# Patient Record
Sex: MALE | Race: WHITE | NOT HISPANIC OR LATINO | Employment: UNEMPLOYED | URBAN - METROPOLITAN AREA
[De-identification: names, ages, dates, MRNs, and addresses within clinical notes are randomized per-mention and may not be internally consistent; named-entity substitution may affect disease eponyms.]

---

## 2023-01-20 ENCOUNTER — APPOINTMENT (OUTPATIENT)
Dept: RADIOLOGY | Facility: MEDICAL CENTER | Age: 63
DRG: 660 | End: 2023-01-20
Attending: EMERGENCY MEDICINE
Payer: COMMERCIAL

## 2023-01-20 ENCOUNTER — HOSPITAL ENCOUNTER (INPATIENT)
Facility: MEDICAL CENTER | Age: 63
LOS: 13 days | DRG: 660 | End: 2023-02-03
Attending: EMERGENCY MEDICINE | Admitting: STUDENT IN AN ORGANIZED HEALTH CARE EDUCATION/TRAINING PROGRAM
Payer: COMMERCIAL

## 2023-01-20 DIAGNOSIS — N32.1 COLOVESICAL FISTULA: ICD-10-CM

## 2023-01-20 DIAGNOSIS — K57.92 DIVERTICULITIS: ICD-10-CM

## 2023-01-20 LAB
ALBUMIN SERPL BCP-MCNC: 3.5 G/DL (ref 3.2–4.9)
ALBUMIN/GLOB SERPL: 0.8 G/DL
ALP SERPL-CCNC: 144 U/L (ref 30–99)
ALT SERPL-CCNC: 43 U/L (ref 2–50)
ANION GAP SERPL CALC-SCNC: 12 MMOL/L (ref 7–16)
APPEARANCE UR: ABNORMAL
AST SERPL-CCNC: 54 U/L (ref 12–45)
BACTERIA #/AREA URNS HPF: ABNORMAL /HPF
BASOPHILS # BLD AUTO: 1 % (ref 0–1.8)
BASOPHILS # BLD: 0.14 K/UL (ref 0–0.12)
BILIRUB SERPL-MCNC: 1.3 MG/DL (ref 0.1–1.5)
BILIRUB UR QL STRIP.AUTO: ABNORMAL
BUN SERPL-MCNC: 17 MG/DL (ref 8–22)
CALCIUM ALBUM COR SERPL-MCNC: 9.8 MG/DL (ref 8.5–10.5)
CALCIUM SERPL-MCNC: 9.4 MG/DL (ref 8.5–10.5)
CHLORIDE SERPL-SCNC: 92 MMOL/L (ref 96–112)
CO2 SERPL-SCNC: 24 MMOL/L (ref 20–33)
COLOR UR: YELLOW
CREAT SERPL-MCNC: 0.74 MG/DL (ref 0.5–1.4)
EOSINOPHIL # BLD AUTO: 0.19 K/UL (ref 0–0.51)
EOSINOPHIL NFR BLD: 1.4 % (ref 0–6.9)
EPI CELLS #/AREA URNS HPF: NEGATIVE /HPF
ERYTHROCYTE [DISTWIDTH] IN BLOOD BY AUTOMATED COUNT: 41.3 FL (ref 35.9–50)
ETHANOL BLD-MCNC: <10.1 MG/DL
GFR SERPLBLD CREATININE-BSD FMLA CKD-EPI: 102 ML/MIN/1.73 M 2
GLOBULIN SER CALC-MCNC: 4.6 G/DL (ref 1.9–3.5)
GLUCOSE SERPL-MCNC: 94 MG/DL (ref 65–99)
GLUCOSE UR STRIP.AUTO-MCNC: NEGATIVE MG/DL
HCT VFR BLD AUTO: 48.6 % (ref 42–52)
HGB BLD-MCNC: 16.6 G/DL (ref 14–18)
HYALINE CASTS #/AREA URNS LPF: ABNORMAL /LPF
IMM GRANULOCYTES # BLD AUTO: 0.27 K/UL (ref 0–0.11)
IMM GRANULOCYTES NFR BLD AUTO: 2 % (ref 0–0.9)
KETONES UR STRIP.AUTO-MCNC: 15 MG/DL
LEUKOCYTE ESTERASE UR QL STRIP.AUTO: ABNORMAL
LIPASE SERPL-CCNC: 45 U/L (ref 11–82)
LYMPHOCYTES # BLD AUTO: 3.35 K/UL (ref 1–4.8)
LYMPHOCYTES NFR BLD: 25.1 % (ref 22–41)
MCH RBC QN AUTO: 31.2 PG (ref 27–33)
MCHC RBC AUTO-ENTMCNC: 34.2 G/DL (ref 33.7–35.3)
MCV RBC AUTO: 91.4 FL (ref 81.4–97.8)
MICRO URNS: ABNORMAL
MONOCYTES # BLD AUTO: 1.08 K/UL (ref 0–0.85)
MONOCYTES NFR BLD AUTO: 8.1 % (ref 0–13.4)
MUCOUS THREADS #/AREA URNS HPF: ABNORMAL /HPF
NEUTROPHILS # BLD AUTO: 8.34 K/UL (ref 1.82–7.42)
NEUTROPHILS NFR BLD: 62.4 % (ref 44–72)
NITRITE UR QL STRIP.AUTO: NEGATIVE
NRBC # BLD AUTO: 0 K/UL
NRBC BLD-RTO: 0 /100 WBC
PH UR STRIP.AUTO: 5.5 [PH] (ref 5–8)
PLATELET # BLD AUTO: 363 K/UL (ref 164–446)
PMV BLD AUTO: 9.2 FL (ref 9–12.9)
POTASSIUM SERPL-SCNC: 4 MMOL/L (ref 3.6–5.5)
PROT SERPL-MCNC: 8.1 G/DL (ref 6–8.2)
PROT UR QL STRIP: 30 MG/DL
RBC # BLD AUTO: 5.32 M/UL (ref 4.7–6.1)
RBC # URNS HPF: ABNORMAL /HPF
RBC UR QL AUTO: ABNORMAL
SODIUM SERPL-SCNC: 128 MMOL/L (ref 135–145)
SP GR UR STRIP.AUTO: >=1.03
UROBILINOGEN UR STRIP.AUTO-MCNC: 0.2 MG/DL
WBC # BLD AUTO: 13.4 K/UL (ref 4.8–10.8)
WBC #/AREA URNS HPF: ABNORMAL /HPF

## 2023-01-20 PROCEDURE — 85025 COMPLETE CBC W/AUTO DIFF WBC: CPT

## 2023-01-20 PROCEDURE — 82077 ASSAY SPEC XCP UR&BREATH IA: CPT

## 2023-01-20 PROCEDURE — 99285 EMERGENCY DEPT VISIT HI MDM: CPT

## 2023-01-20 PROCEDURE — 81001 URINALYSIS AUTO W/SCOPE: CPT

## 2023-01-20 PROCEDURE — 80053 COMPREHEN METABOLIC PANEL: CPT

## 2023-01-20 PROCEDURE — 83735 ASSAY OF MAGNESIUM: CPT

## 2023-01-20 PROCEDURE — 36415 COLL VENOUS BLD VENIPUNCTURE: CPT

## 2023-01-20 PROCEDURE — 93005 ELECTROCARDIOGRAM TRACING: CPT

## 2023-01-20 PROCEDURE — 83690 ASSAY OF LIPASE: CPT

## 2023-01-20 PROCEDURE — 93005 ELECTROCARDIOGRAM TRACING: CPT | Performed by: EMERGENCY MEDICINE

## 2023-01-20 ASSESSMENT — PAIN DESCRIPTION - DESCRIPTORS: DESCRIPTORS: ACHING

## 2023-01-21 PROBLEM — K70.30 ALCOHOLIC CIRRHOSIS OF LIVER WITHOUT ASCITES (HCC): Status: ACTIVE | Noted: 2023-01-21

## 2023-01-21 PROBLEM — N20.0 NEPHROLITHIASIS: Status: ACTIVE | Noted: 2023-01-21

## 2023-01-21 PROBLEM — F15.91 HISTORY OF METHAMPHETAMINE USE: Status: ACTIVE | Noted: 2023-01-21

## 2023-01-21 PROBLEM — N32.1 COLOVESICAL FISTULA: Status: ACTIVE | Noted: 2023-01-21

## 2023-01-21 PROBLEM — K80.20 CHOLELITHIASIS: Status: ACTIVE | Noted: 2023-01-21

## 2023-01-21 PROBLEM — J44.9 COPD (CHRONIC OBSTRUCTIVE PULMONARY DISEASE) (HCC): Status: ACTIVE | Noted: 2023-01-21

## 2023-01-21 PROBLEM — R78.81 BACTEREMIA: Status: ACTIVE | Noted: 2023-01-21

## 2023-01-21 PROBLEM — K57.92 DIVERTICULITIS: Status: ACTIVE | Noted: 2023-01-21

## 2023-01-21 PROBLEM — E87.1 HYPONATREMIA: Status: ACTIVE | Noted: 2023-01-21

## 2023-01-21 LAB
EKG IMPRESSION: NORMAL
MAGNESIUM SERPL-MCNC: 1.8 MG/DL (ref 1.5–2.5)

## 2023-01-21 PROCEDURE — 700105 HCHG RX REV CODE 258: Performed by: EMERGENCY MEDICINE

## 2023-01-21 PROCEDURE — 74177 CT ABD & PELVIS W/CONTRAST: CPT

## 2023-01-21 PROCEDURE — 700105 HCHG RX REV CODE 258: Performed by: STUDENT IN AN ORGANIZED HEALTH CARE EDUCATION/TRAINING PROGRAM

## 2023-01-21 PROCEDURE — 700111 HCHG RX REV CODE 636 W/ 250 OVERRIDE (IP): Performed by: EMERGENCY MEDICINE

## 2023-01-21 PROCEDURE — A9270 NON-COVERED ITEM OR SERVICE: HCPCS | Performed by: STUDENT IN AN ORGANIZED HEALTH CARE EDUCATION/TRAINING PROGRAM

## 2023-01-21 PROCEDURE — 87040 BLOOD CULTURE FOR BACTERIA: CPT | Mod: 91

## 2023-01-21 PROCEDURE — 99406 BEHAV CHNG SMOKING 3-10 MIN: CPT

## 2023-01-21 PROCEDURE — 700105 HCHG RX REV CODE 258

## 2023-01-21 PROCEDURE — 700111 HCHG RX REV CODE 636 W/ 250 OVERRIDE (IP): Performed by: STUDENT IN AN ORGANIZED HEALTH CARE EDUCATION/TRAINING PROGRAM

## 2023-01-21 PROCEDURE — 87086 URINE CULTURE/COLONY COUNT: CPT

## 2023-01-21 PROCEDURE — 700102 HCHG RX REV CODE 250 W/ 637 OVERRIDE(OP): Performed by: STUDENT IN AN ORGANIZED HEALTH CARE EDUCATION/TRAINING PROGRAM

## 2023-01-21 PROCEDURE — 700111 HCHG RX REV CODE 636 W/ 250 OVERRIDE (IP)

## 2023-01-21 PROCEDURE — 99233 SBSQ HOSP IP/OBS HIGH 50: CPT | Mod: GC | Performed by: HOSPITALIST

## 2023-01-21 PROCEDURE — A9270 NON-COVERED ITEM OR SERVICE: HCPCS

## 2023-01-21 PROCEDURE — A9270 NON-COVERED ITEM OR SERVICE: HCPCS | Performed by: HOSPITALIST

## 2023-01-21 PROCEDURE — 36415 COLL VENOUS BLD VENIPUNCTURE: CPT

## 2023-01-21 PROCEDURE — 700102 HCHG RX REV CODE 250 W/ 637 OVERRIDE(OP)

## 2023-01-21 PROCEDURE — 700102 HCHG RX REV CODE 250 W/ 637 OVERRIDE(OP): Performed by: HOSPITALIST

## 2023-01-21 PROCEDURE — 87077 CULTURE AEROBIC IDENTIFY: CPT

## 2023-01-21 PROCEDURE — 99223 1ST HOSP IP/OBS HIGH 75: CPT | Mod: GC | Performed by: STUDENT IN AN ORGANIZED HEALTH CARE EDUCATION/TRAINING PROGRAM

## 2023-01-21 PROCEDURE — 770001 HCHG ROOM/CARE - MED/SURG/GYN PRIV*

## 2023-01-21 PROCEDURE — 87186 SC STD MICRODIL/AGAR DIL: CPT

## 2023-01-21 PROCEDURE — 700117 HCHG RX CONTRAST REV CODE 255: Performed by: EMERGENCY MEDICINE

## 2023-01-21 PROCEDURE — 96365 THER/PROPH/DIAG IV INF INIT: CPT

## 2023-01-21 RX ORDER — POLYETHYLENE GLYCOL 3350 17 G/17G
1 POWDER, FOR SOLUTION ORAL
Status: DISCONTINUED | OUTPATIENT
Start: 2023-01-21 | End: 2023-01-21

## 2023-01-21 RX ORDER — IBUPROFEN 400 MG/1
400 TABLET ORAL ONCE
Status: COMPLETED | OUTPATIENT
Start: 2023-01-21 | End: 2023-01-21

## 2023-01-21 RX ORDER — BUDESONIDE AND FORMOTEROL FUMARATE DIHYDRATE 160; 4.5 UG/1; UG/1
2 AEROSOL RESPIRATORY (INHALATION)
Status: DISCONTINUED | OUTPATIENT
Start: 2023-01-21 | End: 2023-01-21

## 2023-01-21 RX ORDER — BISACODYL 10 MG
10 SUPPOSITORY, RECTAL RECTAL
Status: DISCONTINUED | OUTPATIENT
Start: 2023-01-21 | End: 2023-01-21

## 2023-01-21 RX ORDER — CHOLECALCIFEROL (VITAMIN D3) 125 MCG
5 CAPSULE ORAL NIGHTLY
Status: DISCONTINUED | OUTPATIENT
Start: 2023-01-21 | End: 2023-02-03 | Stop reason: HOSPADM

## 2023-01-21 RX ORDER — AMOXICILLIN AND CLAVULANATE POTASSIUM 875; 125 MG/1; MG/1
1 TABLET, FILM COATED ORAL EVERY 12 HOURS
Status: DISCONTINUED | OUTPATIENT
Start: 2023-01-21 | End: 2023-01-21

## 2023-01-21 RX ORDER — BUDESONIDE AND FORMOTEROL FUMARATE DIHYDRATE 160; 4.5 UG/1; UG/1
2 AEROSOL RESPIRATORY (INHALATION) 2 TIMES DAILY
Status: DISCONTINUED | OUTPATIENT
Start: 2023-01-21 | End: 2023-02-03 | Stop reason: HOSPADM

## 2023-01-21 RX ORDER — AMOXICILLIN 250 MG
2 CAPSULE ORAL 2 TIMES DAILY
Status: DISCONTINUED | OUTPATIENT
Start: 2023-01-21 | End: 2023-01-21

## 2023-01-21 RX ADMIN — Medication 5 MG: at 23:32

## 2023-01-21 RX ADMIN — PIPERACILLIN AND TAZOBACTAM 3.38 G: 3; .375 INJECTION, POWDER, LYOPHILIZED, FOR SOLUTION INTRAVENOUS; PARENTERAL at 00:40

## 2023-01-21 RX ADMIN — BUDESONIDE AND FORMOTEROL FUMARATE DIHYDRATE 2 PUFF: 160; 4.5 AEROSOL RESPIRATORY (INHALATION) at 18:00

## 2023-01-21 RX ADMIN — PIPERACILLIN AND TAZOBACTAM 3.38 G: 3; .375 INJECTION, POWDER, LYOPHILIZED, FOR SOLUTION INTRAVENOUS; PARENTERAL at 22:33

## 2023-01-21 RX ADMIN — PIPERACILLIN AND TAZOBACTAM 3.38 G: 3; .375 INJECTION, POWDER, LYOPHILIZED, FOR SOLUTION INTRAVENOUS; PARENTERAL at 03:47

## 2023-01-21 RX ADMIN — PIPERACILLIN AND TAZOBACTAM 3.38 G: 3; .375 INJECTION, POWDER, LYOPHILIZED, FOR SOLUTION INTRAVENOUS; PARENTERAL at 12:35

## 2023-01-21 RX ADMIN — IBUPROFEN 400 MG: 400 TABLET, FILM COATED ORAL at 23:31

## 2023-01-21 RX ADMIN — IOHEXOL 100 ML: 350 INJECTION, SOLUTION INTRAVENOUS at 00:06

## 2023-01-21 ASSESSMENT — ENCOUNTER SYMPTOMS
SPUTUM PRODUCTION: 0
FLANK PAIN: 0
COUGH: 0
WEIGHT LOSS: 0
FALLS: 0
BACK PAIN: 0
HEADACHES: 0
HEARTBURN: 0
WEAKNESS: 0
DIARRHEA: 1
MYALGIAS: 1
TREMORS: 0
BLOOD IN STOOL: 0
ABDOMINAL PAIN: 0
NERVOUS/ANXIOUS: 0
ORTHOPNEA: 0
VOMITING: 0
MYALGIAS: 0
ABDOMINAL PAIN: 1
CONSTIPATION: 0
SEIZURES: 0
SORE THROAT: 0
NAUSEA: 0
WHEEZING: 1
EYE PAIN: 0
PALPITATIONS: 0
DIZZINESS: 0
EYE REDNESS: 0
NECK PAIN: 0
DIARRHEA: 0
BLURRED VISION: 0
SHORTNESS OF BREATH: 0
STRIDOR: 0
CHILLS: 0
FEVER: 0
SENSORY CHANGE: 0

## 2023-01-21 ASSESSMENT — COGNITIVE AND FUNCTIONAL STATUS - GENERAL
SUGGESTED CMS G CODE MODIFIER MOBILITY: CH
DAILY ACTIVITIY SCORE: 24
SUGGESTED CMS G CODE MODIFIER DAILY ACTIVITY: CH
MOBILITY SCORE: 24

## 2023-01-21 ASSESSMENT — LIFESTYLE VARIABLES
TOTAL SCORE: 0
HAVE PEOPLE ANNOYED YOU BY CRITICIZING YOUR DRINKING: NO
TOTAL SCORE: 0
TOTAL SCORE: 0
SUBSTANCE_ABUSE: 1
HAVE YOU EVER FELT YOU SHOULD CUT DOWN ON YOUR DRINKING: NO
CONSUMPTION TOTAL: INCOMPLETE
EVER FELT BAD OR GUILTY ABOUT YOUR DRINKING: NO
DOES PATIENT WANT TO STOP DRINKING: NO
ALCOHOL_USE: YES
EVER HAD A DRINK FIRST THING IN THE MORNING TO STEADY YOUR NERVES TO GET RID OF A HANGOVER: NO

## 2023-01-21 ASSESSMENT — FIBROSIS 4 INDEX
FIB4 SCORE: 1.41
FIB4 SCORE: 1.41

## 2023-01-21 ASSESSMENT — PATIENT HEALTH QUESTIONNAIRE - PHQ9
2. FEELING DOWN, DEPRESSED, IRRITABLE, OR HOPELESS: NOT AT ALL
2. FEELING DOWN, DEPRESSED, IRRITABLE, OR HOPELESS: NOT AT ALL
1. LITTLE INTEREST OR PLEASURE IN DOING THINGS: NOT AT ALL
SUM OF ALL RESPONSES TO PHQ9 QUESTIONS 1 AND 2: 0
SUM OF ALL RESPONSES TO PHQ9 QUESTIONS 1 AND 2: 0
1. LITTLE INTEREST OR PLEASURE IN DOING THINGS: NOT AT ALL

## 2023-01-21 ASSESSMENT — PAIN DESCRIPTION - PAIN TYPE: TYPE: ACUTE PAIN

## 2023-01-21 NOTE — ASSESSMENT & PLAN NOTE
Nonobstructing nephrolithiasis , incidental finding on imaging.  Continue to monitor renal function and urine output

## 2023-01-21 NOTE — PROGRESS NOTES
[facility] Internal Medicine Daily Progress Note    Date of Service  1/21/2023    [facility] Team: [facility] IM White Team   Attending: Joni Nieves M.d.  Senior Resident: Dr. De Los Santos  Intern:  Dr. Noe  Contact Number: [phone]    Chief Complaint  Jason Yip is a 62 y.o. male admitted 1/20/2023 with dizziness and abdominal pain.     Hospital Course  Jason Yip is a 62 y.o. male who presented 1/20/2023 abdominal pain.   He has a past medical history of cirrhosis (seen on imaging) COPD and polysubstance use (however patient states that he quit several weeks ago). Patient initially presented to [facility] on 1/4/2023 reporting 2 months of difficulty with urination. On initial presentation he was found to be septic with a leukocytosis of over 14,000.  At the time CT abdomen pelvis revealed severe sigmoid diverticulitis with inflammatory phlegmon formation and possible colovesicular fistula. At that time he was treated with ertapenem, Flagyl, and also treated for alcohol withdrawal.  Surgery was consulted and recommended no surgical intervention at the time.   He later presented to [facility] again on 1/15/2023 for evaluation of lower abdominal pain and bubbles in his urine.  Urine cultures at the time revealed resistance to ciprofloxacin.  Repeat CT revealed sigmoid colovesicular fistula and patient was transferred to [facility] for higher level of care on 1/17/2023.  (Records not available from [facility]) Per patient while he was at [facility] he was told that he required IV antibiotics and surgical intervention on outpatient basis after antibiotic treatment for 2 weeks and was discharged on 1/19/2023. Patient states that he was discharged on p.o. Augmentin with a Taxi voucher that took him somewhere in [place] rather than back to [place] and he has been homeless since then. He presented to [facility] and was  discharged back to the shelter last night. He was brought by ambulance from [facility] for continued abd pain.     Interval Problem Update  Reviewed hx with Mr. Yip. He said he was not currently having any abdominal pain. He said when present it is a 3-4/10 and when it is at it's worse it is a 9/10. It has only braulio alleviated with dilaudid and is worsened with urination and with Bms. His main complaint this morning was that he was having cramping of his hands and feet but said that is a chronic problem for him. He was hopeful to talk to urology and said that he was upset at the previous hospital when told he would need 2 wks abs before surgery.   -Requested records from [facility] and [facility].   -Continue IV Abx (rec by Andreas as tx for complicated diverticulitis)   -Consult surgery in AM.   -Melatonin QPM    I have discussed this patient's plan of care and discharge plan at IDT rounds today with Case Management, Nursing, Nursing leadership, and other members of the IDT team.    Consultants/Specialty  None    Code Status  Full Code    Disposition  Patient is not medically cleared for discharge.   Anticipate discharge to to home with close outpatient follow-up.  I have placed the appropriate orders for post-discharge needs.    Review of Systems  Review of Systems   Constitutional:  Negative for chills, fever and malaise/fatigue.   HENT:  Negative for congestion and sore throat.    Respiratory:  Negative for cough, sputum production, shortness of breath and stridor.    Cardiovascular:  Negative for chest pain, palpitations and orthopnea.   Gastrointestinal:  Negative for abdominal pain, blood in stool, constipation, diarrhea, heartburn, melena, nausea and vomiting.   Genitourinary:  Negative for dysuria, flank pain, frequency, hematuria and urgency.   Musculoskeletal:  Positive for myalgias. Negative for back pain and neck pain.        Chronic cramping of hands and feet bilat.     Neurological:  Negative for dizziness, tremors, weakness and headaches.      Physical Exam  Temp:  [36.1 °C (97 °F)-36.2 °C (97.2 °F)] 36.1 °C (97 °F)  Pulse:  [66-96] 66  Resp:  [16-20] 16  BP: (113-149)/(68-91) 134/78  SpO2:  [90 %-95 %] 95 %    Physical Exam  Vitals and nursing note reviewed.   Constitutional:       General: He is not in acute distress.     Appearance: Normal appearance. He is normal weight. He is not ill-appearing or toxic-appearing.   HENT:      Head: Normocephalic and atraumatic.      Mouth/Throat:      Mouth: Mucous membranes are moist.      Pharynx: Oropharynx is clear. No oropharyngeal exudate or posterior oropharyngeal erythema.   Cardiovascular:      Rate and Rhythm: Normal rate and regular rhythm.      Pulses: Normal pulses.      Heart sounds: Normal heart sounds. No murmur heard.    No friction rub. No gallop.   Pulmonary:      Effort: Pulmonary effort is normal. No respiratory distress.      Breath sounds: Normal breath sounds. No stridor. No wheezing, rhonchi or rales.   Abdominal:      General: Abdomen is flat. Bowel sounds are normal. There is no distension.      Palpations: Abdomen is soft.      Tenderness: There is no abdominal tenderness. There is no guarding or rebound.      Comments: Patient said pain is usually at suprapubic area. No pain with palpation.    Musculoskeletal:         General: No swelling or deformity.      Right lower leg: No edema.      Left lower leg: No edema.   Skin:     General: Skin is warm and dry.      Coloration: Skin is not jaundiced or pale.      Findings: No bruising, lesion or rash.   Neurological:      Mental Status: He is alert and oriented to person, place, and time.       Fluids    Intake/Output Summary (Last 24 hours) at 1/21/2023 0712  Last data filed at 1/21/2023 0124  Gross per 24 hour   Intake 100 ml   Output --   Net 100 ml       Laboratory  Recent Labs     01/20/23  1818   WBC 13.4*   RBC 5.32   HEMOGLOBIN 16.6   HEMATOCRIT 48.6   MCV  91.4   MCH 31.2   MCHC 34.2   RDW 41.3   PLATELETCT 363   MPV 9.2     Recent Labs     01/20/23  1818   SODIUM 128*   POTASSIUM 4.0   CHLORIDE 92*   CO2 24   GLUCOSE 94   BUN 17   CREATININE 0.74   CALCIUM 9.4                   Imaging  CT-ABDOMEN-PELVIS WITH    Result Date: 1/21/2023  1.  Changes of sigmoid diverticulitis, small collection of fluid with focus of air adjacent to sigmoid colon is seen compatible with microperforation and small diverticular abscess. 2.  Colovesicular fistula is seen with air in the bladder. 3.  Irregular hepatic contour favoring changes of cirrhosis 4.  Segment 2 duodenal diverticula 5.  Small fat-containing right inguinal hernia 6.  Left nephrolithiasis 7.  Cholelithiasis 8.  Atherosclerosis These findings were discussed with the patient's clinician, Kenji Coronel, on 1/21/2023 12:22 AM.    CT-RENAL COLIC EVALUATION(A/P W/O)    Result Date: 1/4/2023  IMPRESSION: 1. Severe sigmoid diverticulosis with severe diverticulitis with inflammatory phlegmon along the right with inflammatory changes extending to the dome of the bladder and 2 adjacent loop of small bowel. No drainable abscess is present. 2. These findings were discussed with Dr. Gore shortly following completion of the study.    Electronically signed by: Carlos Taylor 1/4/2023 11:39 AM       [id number] VIEW    Result Date: 1/15/2023  IMPRESSION: 1. Mild adynamic ileus.       Electronically signed by: Carlos Taylor 1/15/2023 12:52 PM       DX-CHEST-PORTABLE (1 VIEW)    Result Date: 1/15/2023  IMPRESSION: 1. No evidence of acute process in the chest.    Electronically signed by: Carlos Taylor 1/15/2023 12:50 PM       CT ABDOMEN-PELVIS WITH IV CONTRAST    Result Date: 1/15/2023  IMPRESSION: 1. Severe sigmoid diverticulitis with probable multiple small abscesses in the surrounding tissues. No large drainable abscess is seen. The inflammatory changes extend to the bladder. Air in the bladder is consistent with a  sigmoid colon bladder fistula. 2. Tiny gallstone without evidence of acute cholecystitis or bile duct dilatation. 3. Splenomegaly without focal abnormality.    Electronically signed by: Carlos Taylor 1/15/2023 4:25 PM       CT-CTA CHEST PULMONARY ARTERY W/ RECONS    Result Date: 1/16/2023  IMPRESSION: 1. No evidence of pulmonary emboli. 2. New subsegmental atelectasis right lower lobe.    Electronically signed by: Carlos Taylor 1/16/2023 12:15 PM           Assessment/Plan  Problem Representation:    * Colovesical fistula  Assessment & Plan  -Seen on imaging on 117 and again on 1/21.  Per Dr. Leong who evaluated patient at [facility] 1/17, recommended 2 weeks of antibiotics prior to proceeding with surgery.  -Prior cultures revealed resistance to ciprofloxacin.   -Surgery consult  and urology consult in a.m.  -Full liquid diet  -Patient initiated on Zosyn.  May consider transition to Augmentin if patient stabilizes.      Complicated Diverticulitis- (present on admission)  Assessment & Plan  -Complicated by multiple phlegmon and colovesicular fistula  Plan:   -Continue IV Zosyn  -Full liquid diet  -serial CT scans until the resolution of the abscess; If he fails to improve after two to three days of antibiotic therapy may require surgery if percutaneous drainage is not an option.     Bacteremia  Assessment & Plan  -Per outside hospital records blood cultures positive for gram positive cocci  -Repeat blood cultures  -Obtain [facility] records     History of methamphetamine use  Assessment & Plan  -Patient denies any use in the past several weeks  -Encourage continued sobriety     Nephrolithiasis  Assessment & Plan  -Nonobstructing nephrolithiasis , incidental finding on imaging.  -Continue to monitor renal function and urine output     Cholelithiasis  Assessment & Plan  -Asymptomatic  -Outpatient follow up     Hyponatremia  Assessment & Plan  -128 on admission  -Likely secondary to GI losses  and possible underlying cirrhosis  -Currently asymptomatic  -Continue to monitor     COPD (chronic obstructive pulmonary disease) (HCC)  Assessment & Plan  -No PFTs on file  -Chest x-ray clear on admission, no wheezing on exam  -Symbicort daily  -As needed albuterol     Alcoholic cirrhosis of liver without ascites (HCC)  Assessment & Plan  -Cirrhotic changes seen on imaging  -Patient is mildly distended on exam however no clear signs of ascites.  No hepatic encephalopathy at -this time or any signs of melena      VTE prophylaxis: SCDs/TEDs      I have performed a physical exam and reviewed and updated ROS and Plan today (1/21/2023). In review of yesterday's note (1/20/2023), there are no changes except as documented above.

## 2023-01-21 NOTE — ED NOTES
Pt prompted and informed of need for urine. Pt is unable to provide urine at this point and wants to sleep, pt educated on need for urine and handed urinal.

## 2023-01-21 NOTE — ED TRIAGE NOTES
"Chief Complaint   Patient presents with    Dizziness    Abdominal Pain     Upper quadrants, denies NVD       Pt BIBA from [facility].  Pt was flown by fixed wing on 1/17/23 for \"severe diverticulitis with inta abdominal abscesses and colovesicular fistula.\"  Surgeon wants to wait several weeks before surgery.  Pt discharged 1/19 from [facility].  Pt reporting lower abdominal pain denies NVD.  Pt reports \"bubbles are coming out of his penis after he pees.\" Denies dysuria.  Pt reporting dizziness and generalized weakness.     Blood Pressure: (!) 149/78, Pulse: 91, Respiration: 18, Temperature: 36.2 °C (97.2 °F), Height: 182.9 cm (6'), Weight: 81.2 kg (179 lb), BMI (Calculated): 24.28, BSA (Calculated): 2, Pulse Oximetry: 94 %, O2 (LPM): 0, O2 Delivery Device: None - Room Air    "

## 2023-01-21 NOTE — ASSESSMENT & PLAN NOTE
Given 128 on admission  Likely secondary to GI losses and possible underlying cirrhosis  Currently asymptomatic  Continue to monitor

## 2023-01-21 NOTE — ASSESSMENT & PLAN NOTE
Per outside hospital records blood cultures positive for gram positive cocci  Repeat blood cultures  Continue Unasyn  Obtain [facility] records

## 2023-01-21 NOTE — ASSESSMENT & PLAN NOTE
Cirrhotic changes seen on imaging  Patient is mildly distended on exam however no clear signs of ascites.  No hepatic encephalopathy at this time or any signs of melena

## 2023-01-21 NOTE — CARE PLAN
The patient is Stable - Low risk of patient condition declining or worsening         Progress made toward(s) clinical / shift goals:  pt educated on POC. Pt verbalized understanding      Problem: Knowledge Deficit - Standard  Goal: Patient and family/care givers will demonstrate understanding of plan of care, disease process/condition, diagnostic tests and medications  Outcome: Progressing

## 2023-01-21 NOTE — RESPIRATORY CARE
"  COPD EDUCATION by COPD CLINICAL EDUCATOR  (Phone: [phone])  1/21/2023 at 10:47 AM by Carla Fine RRT     Patient was interviewed by Respiratory Education team for COPD program. Patient refused COPD program. Information packet about lung disease, its treatments and \"Stop Smoking\" information given. Total of 6 minutes spent discussion cessation of all inhaled products and encouraged cessation.    COPD Assessment  COPD Clinical Specialists ONLY  COPD Education Initiated: Yes--Short Intervention  DME Company: none prior currently on Room [employer]  Physician Follow Up Appointment: 02/02/23  Physician Name: Came from [place], [place] Dr Carie MD is PCP next appt noted  Pulmonologist Name: none prior  Referrals Initiated: Yes  Smoking Cessation: Yes  $ Smoking Cessation 3-10 Minutes: Symptomatic  Home Health Care:  (SW/CM working on safe DC back to [place] when medically stable)  Is this a COPD exacerbation patient?: No    PFT Results  No results found for: PFT    Meds to Beds  Would the patient like to opt in for Bedside Medication Delivery at Discharge?: Yes, interested (lives out of town)     MY COPD ACTION PLAN     It is recommended that patients and physicians /healthcare providers complete this action plan together. This plan should be discussed at each physician visit and updated as needed.    The green, yellow and red zones show groups of symptoms of COPD. This list of symptoms is not comprehensive, and you may experience other symptoms. In the \"Actions\" column, your healthcare provider has recommended actions for you to take based on your symptoms.    Patient Name: Jason Yip   YOB: 1960   Last Updated on:     Green Zone:  I am doing well today Actions   •  Usual activitiy and exercise level •  Take daily medications   •  Usual amounts of cough and phlegm/mucus •  Use oxygen as prescribed   •  Sleep well at night •  Continue regular exercise/diet plan   •  Appetite is good •  At all times avoid " "cigarette smoke, inhaled irritants     Daily Medications (these medications are taken every day):   Budesonide-Formoterol Fumarate (Symbicort) 2 Puffs Twice daily     Additional Information:  THis has been started while inpatient and will continue at discharge. Remember to rinse mouth after use    Yellow Zone:  I am having a bad day or a COPD flare Actions   •  More breathless than usual •  Continue daily medications   •  I have less energy for my daily activities •  Use quick relief inhaler as ordered   •  Increased or thicker phlegm/mucus •  Use oxygen as prescribed   •  Using quick relief inhaler/nebulizer more often •  Get plenty of rest   •  Swelling of ankles more than usual •  Use pursed lip breathing   •  More coughing than usual •  At all times avoid cigarette smoke, inhaled irritants   •  I feel like I have a \"chest cold\"   •  Poor sleep and my symptoms woke me up   •  My appetite is not good   •  My medicine is not helping    •  Call provider immediately if symptoms don’t improve     Continue daily medications, add rescue medications:   Albuterol 2 Puffs         Medications to be used during a flare up, (as Discussed with Provider):              Red Zone:  I need urgent medical care Actions   •  Severe shortness of breath even at rest •  Call 911 or seek medical care immediately   •  Not able to do any activity because of breathing    •  Fever or shaking chills    •  Feeling confused or very drowsy     •  Chest pains    •  Coughing up blood                  "

## 2023-01-21 NOTE — ED PROVIDER NOTES
ED Provider Note    CHIEF COMPLAINT  Chief Complaint   Patient presents with    Dizziness    Abdominal Pain     Upper quadrants, denies NVD       EXTERNAL RECORDS REVIEWED  Other review the patient's discharge paperwork that he has in his hand from [facility]    HPI/ROS    OUTSIDE HISTORIAN(S):  EMS run sheet and signed off to the nurse    Jason Phi is a 62 y.o. male who presents with abdominal pain.  The patient has a complex history.  He presents with his paperwork when he was recently discharged from [facility] after he was found to have a colovesicular fistula and diverticulitis.  The patient states this started about 5 weeks ago when he started having difficulties with urination and this progressed to abdominal pain and he noticed that he is passing some gas through his penis.  The patient states that he also developed difficulties with balance and felt weak.  The patient states he was abusing alcohol pretty heavily as well as some amphetamines.  He was evaluated at a small hospital in [place] and was found to have the diverticulitis with the fistula and he was transferred to [facility].  The patient was seen by Dr. Gonzalez who recommended IV antibiotics and surgical intervention on an outpatient basis.  The patient states that he was discharged from [facility] with a taxi voucher that took him somewhere in [place] instead of back home to [place].  He has been homeless since that time.  He went to the homeless shelter last night and then called the ambulance [facility].  Ambulance run sheet states the patient complained of abdominal pain and he was transferred from the shelter to [facility] for further evaluation.    PAST MEDICAL HISTORY   has a past medical history of Chronic obstructive pulmonary disease (HCC).    SURGICAL HISTORY  patient denies any surgical history    FAMILY HISTORY  History reviewed. No pertinent family  history.    SOCIAL HISTORY  Social History     Tobacco Use    Smoking status: Every Day     Packs/day: 0.50     Types: Cigarettes    Smokeless tobacco: Not on file   Substance and Sexual Activity    Alcohol use: Yes     Alcohol/week: 72.0 oz     Types: 120 Standard drinks or equivalent per week    Drug use: Not on file    Sexual activity: Not on file       CURRENT MEDICATIONS  Home Medications       Reviewed by Marleni Justin R.N. (Registered Nurse) on 01/20/23 at 1816  Med List Status: <None>     Medication Last Dose Status        Patient Reji Taking any Medications                           ALLERGIES  Allergies   Allergen Reactions    Keflex Hives       PHYSICAL EXAM  VITAL SIGNS: BP (!) 149/78   Pulse 91   Temp 36.2 °C (97.2 °F) (Temporal)   Resp 18   Ht 1.829 m (6')   Wt 81.2 kg (179 lb)   SpO2 94%   BMI 24.28 kg/m²    Patient appears ill in appearance    HEENT unremarkable    Pulmonary diminished breath sounds bilaterally with no asymmetry to auscultation    Cardiovascular S1-S2 with a regular rate and rhythm    GI slight distention with hypoactive bowel sounds.  The patient does have tenderness in the left lower quadrant    Skin no pallor    Extremities no edema and atraumatic    Neurologic examination is grossly intact    DIAGNOSTIC STUDIES   Results for orders placed or performed during the hospital encounter of 01/20/23   CBC WITH DIFFERENTIAL   Result Value Ref Range    WBC 13.4 (H) 4.8 - 10.8 K/uL    RBC 5.32 4.70 - 6.10 M/uL    Hemoglobin 16.6 14.0 - 18.0 g/dL    Hematocrit 48.6 42.0 - 52.0 %    MCV 91.4 81.4 - 97.8 fL    MCH 31.2 27.0 - 33.0 pg    MCHC 34.2 33.7 - 35.3 g/dL    RDW 41.3 35.9 - 50.0 fL    Platelet Count 363 164 - 446 K/uL    MPV 9.2 9.0 - 12.9 fL    Neutrophils-Polys 62.40 44.00 - 72.00 %    Lymphocytes 25.10 22.00 - 41.00 %    Monocytes 8.10 0.00 - 13.40 %    Eosinophils 1.40 0.00 - 6.90 %    Basophils 1.00 0.00 - 1.80 %    Immature Granulocytes 2.00 (H) 0.00 - 0.90 %    Nucleated  RBC 0.00 /100 WBC    Neutrophils (Absolute) 8.34 (H) 1.82 - 7.42 K/uL    Lymphs (Absolute) 3.35 1.00 - 4.80 K/uL    Monos (Absolute) 1.08 (H) 0.00 - 0.85 K/uL    Eos (Absolute) 0.19 0.00 - 0.51 K/uL    Baso (Absolute) 0.14 (H) 0.00 - 0.12 K/uL    Immature Granulocytes (abs) 0.27 (H) 0.00 - 0.11 K/uL    NRBC (Absolute) 0.00 K/uL   COMP METABOLIC PANEL   Result Value Ref Range    Sodium 128 (L) 135 - 145 mmol/L    Potassium 4.0 3.6 - 5.5 mmol/L    Chloride 92 (L) 96 - 112 mmol/L    Co2 24 20 - 33 mmol/L    Anion Gap 12.0 7.0 - 16.0    Glucose 94 65 - 99 mg/dL    Bun 17 8 - 22 mg/dL    Creatinine 0.74 0.50 - 1.40 mg/dL    Calcium 9.4 8.5 - 10.5 mg/dL    AST(SGOT) 54 (H) 12 - 45 U/L    ALT(SGPT) 43 2 - 50 U/L    Alkaline Phosphatase 144 (H) 30 - 99 U/L    Total Bilirubin 1.3 0.1 - 1.5 mg/dL    Albumin 3.5 3.2 - 4.9 g/dL    Total Protein 8.1 6.0 - 8.2 g/dL    Globulin 4.6 (H) 1.9 - 3.5 g/dL    A-G Ratio 0.8 g/dL   LIPASE   Result Value Ref Range    Lipase 45 11 - 82 U/L   URINALYSIS (UA)    Specimen: Urine, Clean Catch   Result Value Ref Range    Color Yellow     Character Cloudy (A)     Specific Gravity >=1.030 <1.035    Ph 5.5 5.0 - 8.0    Glucose Negative Negative mg/dL    Ketones 15 (A) Negative mg/dL    Protein 30 (A) Negative mg/dL    Bilirubin Small (A) Negative    Urobilinogen, Urine 0.2 Negative    Nitrite Negative Negative    Leukocyte Esterase Moderate (A) Negative    Occult Blood Large (A) Negative    Micro Urine Req Microscopic    DIAGNOSTIC ALCOHOL   Result Value Ref Range    Diagnostic Alcohol <10.1 <10.1 mg/dL   ESTIMATED GFR   Result Value Ref Range    GFR (CKD-EPI) 102 >60 mL/min/1.73 m 2   CORRECTED CALCIUM   Result Value Ref Range    Correct Calcium 9.8 8.5 - 10.5 mg/dL   URINE MICROSCOPIC (W/UA)   Result Value Ref Range    -150 (A) /hpf    RBC 2-5 (A) /hpf    Bacteria Few (A) None /hpf    Epithelial Cells Negative /hpf    Mucous Threads Few /hpf    Hyaline Cast 0-2 /lpf   EKG   Result Value  Ref Range    Report       [facility] Emergency Dept.    Test Date:  2023  Pt Name:    MERCY ZAMUDIO                 Department: ER  MRN:        [id number]                      Room:       RD 02  Gender:     Male                         Technician: [id number]  :        1960                   Requested By:ER TRIAGE PROTOCOL  Order #:    [id number]                    Reading MD: JASKARAN CORONEL MD    Measurements  Intervals                                Axis  Rate:       92                           P:          33  NY:         131                          QRS:        4  QRSD:       88                           T:          26  QT:         375  QTc:        464    Interpretive Statements  Twelve-lead EKG shows a normal sinus rhythm with a ventricular rate of 92,  normal QRS, normal intervals, no ST segment elevation or depression  Electronically Signed On 2023 0:33:33 PST by JASKARAN CORONEL MD         EKG  I have independently interpreted this EKG  Please see my interpretation above      RADIOLOGY  CT-ABDOMEN-PELVIS WITH   Final Result         1.  Changes of sigmoid diverticulitis, small collection of fluid with focus of air adjacent to sigmoid colon is seen compatible with microperforation and small diverticular abscess.   2.  Colovesicular fistula is seen with air in the bladder.   3.  Irregular hepatic contour favoring changes of cirrhosis   4.  Segment 2 duodenal diverticula   5.  Small fat-containing right inguinal hernia   6.  Left nephrolithiasis   7.  Cholelithiasis   8.  Atherosclerosis      These findings were discussed with the patient's clinician, Jaskaran Coronel, on 2023 12:22 AM.          COURSE & MEDICAL DECISION MAKING    This is a 62-year-old gentleman who presents the emerged part with abdominal discomfort and dizziness.  I suspect the dizziness is from his intra-abdominal infection as his EKG does not show any arrhythmic change.  The patient is neurologically  intact therefore an intracranial source would be unlikely.  I did review his records from [facility] where he had consultation by Dr. Gonzalez as well as urology.  They recommended IV antibiotics to cool the infection prior to surgical intervention.  The patient was discharged on Augmentin.  He was sent to [facility] and unfortunately is been on the street in the shelter over the last 24 to 48 hours.  The patient does have continued discomfort therefore repeat imaging was performed.  He does have diverticulitis with a small fluid collection consistent with perforation and diverticular abscess formation.  He also has a colovesicular fistula.  I suspect this is the source of the patient's infection from the urinalysis.  The patient will receive Zosyn for antibiotic coverage.  We will get the patient back to the hospital with urology and surgery in consultation.      FINAL DIAGNOSIS  1.  Diverticulitis with perforation and abscess formation  2.  Colovesicular fistula  3.  Dizziness    Disposition  Patient will be admitted in stable condition           Electronically signed by: Kenji Coronel M.D., 1/20/2023 6:42 PM

## 2023-01-21 NOTE — ASSESSMENT & PLAN NOTE
Seen on imaging on 117 and again on 1/21.  Per Dr. Leong who evaluated patient at [facility] 1/17, recommended 2 weeks of antibiotics prior to proceeding with surgery.  Prior cultures revealed resistance to ciprofloxacin.   Surgery consult  and urology consult in a.m.  Full liquid diet  Patient initiated on Zosyn.  May consider transition to Augmentin if patient stabilizes.

## 2023-01-21 NOTE — ED NOTES
Call to receiving RN, Heena, report given, pt transport with ED tech to room S611-2 with all personal items

## 2023-01-21 NOTE — PROGRESS NOTES
Assumed care of pt. A&ox4, denies pain at this time. Pt refused 2 RN skin check despite education. Pt educated on POC. Bed locked and in the lowest position, call light within reach, all needs met at this time.

## 2023-01-21 NOTE — ASSESSMENT & PLAN NOTE
Complicated by multiple phlegmon and colovesicular fistula  Continue IV Zosyn  Full liquid diet  Follow-up with surgery

## 2023-01-21 NOTE — PROGRESS NOTES
"4 Eyes Skin Assessment Completed by STEVE Naik and STEVE Barroso.    Overall skin apperance dry/flakey  Pt refused skin check. Pt states \"I have no wounds, theres no reason to check\"      Interventions In Place Waffle Overlay and Pillows    Possible Skin Injury No    Pictures Uploaded Into Epic N/A  Wound Consult Placed N/A  RN Wound Prevention Protocol Ordered No    "

## 2023-01-21 NOTE — ASSESSMENT & PLAN NOTE
No PFTs on file  Chest x-ray clear on admission, no wheezing on exam  Symbicort daily  As needed albuterol

## 2023-01-21 NOTE — H&P
[facility] Internal Medicine History & Physical Note    Date of Service  1/21/2023    [facility] Team: ZARA   Attending: Elie Judd M.d.  Senior Resident: Dr. Leia Coppola  Contact Number: [phone]    Primary Care Physician  Pcp Pt States None    Consultants    General Surgery: Dr. Leong    Code Status  Full Code    Chief Complaint  Chief Complaint   Patient presents with    Dizziness    Abdominal Pain     Upper quadrants, denies NVD       History of Presenting Illness (HPI):       Jason Yip is a 62 y.o. male who presented 1/20/2023 abdominal pain.   He has a past medical history of cirrhosis (seen on imaging) COPD and polysubstance use (however patient states that he quit several weeks ago).           Patient initially presented to [facility] in [facility] on 1/4/2023 reporting 2 months of difficulty with urination.  Initial presentation he was found to be septic with a leukocytosis of over 14,000.  At the time CT abdomen pelvis revealed severe sigmoid diverticulitis with inflammatory phlegmon formation and possible colovesicular fistula.  The time he was treated with ertapenem, Flagyl.  Also treated for alcohol withdrawal.  Surgery was consulted and recommended no surgical intervention at the time.  At the time he was living in fifth wheel trailer in [facility]. He states that he lives with his friend Declan Richards. He reports that their trailer currently has no water, and the  is backed up.  He was discharged on 1/6 with Cipro and Flagyl to be taken for 10 days.    He presented to [facility] again on 1/15 for evaluation of lower abdominal pains and bubbles in his urine.  Urine cultures at the time revealed resistance to ciprofloxacin.  Repeat CT revealed sigmoid colovesicular fistula and patient was transferred to [facility] for higher level of care on 1/17..  [facility] patient was told that he required IV antibiotics and surgical  intervention on outpatient basis after antibiotic treatment for 2 weeks and was discharged on 1/19.  Patient states that he was discharged on p.o. Augmentin with a Taxi voucher that took him somewhere in [place] rather than back to [place] and has been homeless since then.  He went to [facility] and was discharged back to the shelter last night.    He was brought in by ambulance around from [facility].  On admission blood pressure was 140s over 70s, heart rate in 90s temperature 97.2.  Labs on admission notable for WBC 13.4, hemoglobin 16.6, sodium 128, potassium 4, chloride 92, bicarb 24, AST 54, .  Alcohol undetectable.  Urinalysis notable for moderate leukocyte esterase, large amounts of occult blood few bacteria.  CT revealed sigmoid diverticulitis, small collection of fluid adjacent to sigmoid: Likely secondary to small diverticular abscess, colovesicular fistula with air in the bladder, irregular hepatic contour fever and changes of cirrhosis, small fat-containing right inguinal hernia, left obstructive nephrolithiasis and cholelithiasis.     I discussed the plan of care with patient attending     Review of Systems  Review of Systems   Constitutional:  Negative for chills, fever and weight loss.   HENT:  Negative for hearing loss and sore throat.    Eyes:  Negative for blurred vision, pain and redness.   Respiratory:  Positive for wheezing. Negative for cough, sputum production and shortness of breath.    Cardiovascular:  Negative for chest pain and palpitations.   Gastrointestinal:  Positive for abdominal pain and diarrhea. Negative for blood in stool, heartburn, melena, nausea and vomiting.        Reports about 2-3 BM daily   Genitourinary:  Positive for dysuria and urgency. Negative for flank pain and hematuria.   Musculoskeletal:  Negative for falls, myalgias and neck pain.   Skin:  Negative for itching and rash.   Neurological:  Negative for dizziness, sensory change, seizures and  weakness.   Psychiatric/Behavioral:  Positive for substance abuse. The patient is not nervous/anxious.      Past Medical History   has a past medical history of Chronic obstructive pulmonary disease (HCC).    Surgical History  None     Family History   Noncontributary    Social History  Tobacco: Current smoker  Alcohol: Reports about 1 drink per day  Recreational drugs (illegal or prescription): Former history of methamphetamine use.  Patient states that he quit using IV methamphetamine 13 years ago and stopped meth altogether 4 weeks ago.  Employment: Not currently employed  [facility]  Patient was transported to [place] and was homeless since his last discharge.  Recent Travel:  From [facility]  Primary Care Provider: Not Reviewed  Other (stressors, spirituality, exposures): homelessness    Allergies  Allergies   Allergen Reactions    Keflex Hives       Medications  None       Physical Exam  Temp:  [36.1 °C (97 °F)-36.2 °C (97.2 °F)] 36.1 °C (97 °F)  Pulse:  [66-96] 66  Resp:  [16-20] 16  BP: (113-149)/(68-91) 134/78  SpO2:  [90 %-95 %] 95 %  Blood Pressure: 119/81   Temperature: 36.2 °C (97.2 °F)   Pulse: 88   Respiration: 20   Pulse Oximetry: 92 %       Physical Exam  Constitutional:       Appearance: Normal appearance.   HENT:      Head: Normocephalic and atraumatic.      Nose: Nose normal.      Mouth/Throat:      Mouth: Mucous membranes are moist.      Pharynx: Oropharynx is clear.   Eyes:      Extraocular Movements: Extraocular movements intact.      Conjunctiva/sclera: Conjunctivae normal.      Pupils: Pupils are equal, round, and reactive to light.   Cardiovascular:      Rate and Rhythm: Normal rate and regular rhythm.      Heart sounds: No murmur heard.  Pulmonary:      Effort: Pulmonary effort is normal. No respiratory distress.      Breath sounds: Normal breath sounds. No wheezing or rales.   Abdominal:      General: Bowel sounds are  normal. There is distension.      Tenderness: There is no abdominal tenderness. There is no guarding or rebound.      Comments: Mild distention   Musculoskeletal:         General: No swelling or deformity.      Cervical back: Normal range of motion.   Skin:     General: Skin is warm and dry.      Coloration: Skin is not jaundiced.   Neurological:      General: No focal deficit present.      Mental Status: He is alert and oriented to person, place, and time.   Psychiatric:         Mood and Affect: Mood normal.         Behavior: Behavior normal.         Thought Content: Thought content normal.         Judgment: Judgment normal.       Laboratory:  Recent Labs     01/20/23  1818   WBC 13.4*   RBC 5.32   HEMOGLOBIN 16.6   HEMATOCRIT 48.6   MCV 91.4   MCH 31.2   MCHC 34.2   RDW 41.3   PLATELETCT 363   MPV 9.2     Recent Labs     01/20/23  1818   SODIUM 128*   POTASSIUM 4.0   CHLORIDE 92*   CO2 24   GLUCOSE 94   BUN 17   CREATININE 0.74   CALCIUM 9.4     Recent Labs     01/20/23  1818   ALTSGPT 43   ASTSGOT 54*   ALKPHOSPHAT 144*   TBILIRUBIN 1.3   LIPASE 45   GLUCOSE 94         No results for input(s): NTPROBNP in the last 72 hours.      No results for input(s): TROPONINT in the last 72 hours.    Imaging:  CT-ABDOMEN-PELVIS WITH   Final Result         1.  Changes of sigmoid diverticulitis, small collection of fluid with focus of air adjacent to sigmoid colon is seen compatible with microperforation and small diverticular abscess.   2.  Colovesicular fistula is seen with air in the bladder.   3.  Irregular hepatic contour favoring changes of cirrhosis   4.  Segment 2 duodenal diverticula   5.  Small fat-containing right inguinal hernia   6.  Left nephrolithiasis   7.  Cholelithiasis   8.  Atherosclerosis      These findings were discussed with the patient's clinician, Kenji Coronel, on 1/21/2023 12:22 AM.          Assessment/Plan:  Problem Representation:   Jason Vazcaroline 62-year-old male with past medical history of  COPD, questionable history of cirrhosis, history of polysubstance abuse who presented after recently being diagnosed with diverticulitis complicated by colovesicular fistula.  He was seen by  at [facility] on 1/17 and was told to continue 2 weeks of antibiotics prior to surgical intervention.  Patient  is originally from [facility] and is currently homeless as he was unable to obtain transportation back to his home.     I anticipate this patient will require at least two midnights for appropriate medical management, necessitating inpatient admission because of colovesicuar fistula requiring IV antiobiotics    Patient will need a Med/Surg bed on MEDICAL service .  The need is secondary to colovesicular fistula requiring IV antibiotics. .    * Colovesical fistula  Assessment & Plan  Seen on imaging on 117 and again on 1/21.  Per Dr. Leong who evaluated patient at [facility] 1/17, recommended 2 weeks of antibiotics prior to proceeding with surgery.  Prior cultures revealed resistance to ciprofloxacin.   Surgery consult  and urology consult in a.m.  Full liquid diet  Patient initiated on Zosyn.  May consider transition to Augmentin if patient stabilizes.     Diverticulitis- (present on admission)  Assessment & Plan  Complicated by multiple phlegmon and colovesicular fistula  Continue IV Zosyn  Full liquid diet  Follow-up with surgery      Bacteremia  Assessment & Plan  Per outside hospital records blood cultures positive for gram positive cocci  Repeat blood cultures  Continue Unasyn  Obtain [facility] records    History of methamphetamine use  Assessment & Plan  Patient denies any use in the past several weeks  Encourage continued sobriety    Nephrolithiasis  Assessment & Plan  Nonobstructing nephrolithiasis , incidental finding on imaging.  Continue to monitor renal function and urine output      Cholelithiasis  Assessment & Plan  Asymptomatic  Outpatient follow  up    Hyponatremia  Assessment & Plan  Given 128 on admission  Likely secondary to GI losses and possible underlying cirrhosis  Currently asymptomatic  Continue to monitor    COPD (chronic obstructive pulmonary disease) (HCC)  Assessment & Plan  No PFTs on file  Chest x-ray clear on admission, no wheezing on exam  Symbicort daily  As needed albuterol    Alcoholic cirrhosis of liver without ascites (HCC)  Assessment & Plan  Cirrhotic changes seen on imaging  Patient is mildly distended on exam however no clear signs of ascites.  No hepatic encephalopathy at this time or any signs of melena          VTE prophylaxis: SCDs/TEDs

## 2023-01-22 LAB
ALBUMIN SERPL BCP-MCNC: 3.2 G/DL (ref 3.2–4.9)
ALBUMIN/GLOB SERPL: 0.8 G/DL
ALP SERPL-CCNC: 141 U/L (ref 30–99)
ALT SERPL-CCNC: 38 U/L (ref 2–50)
ANION GAP SERPL CALC-SCNC: 9 MMOL/L (ref 7–16)
AST SERPL-CCNC: 40 U/L (ref 12–45)
BASOPHILS # BLD AUTO: 1.1 % (ref 0–1.8)
BASOPHILS # BLD: 0.1 K/UL (ref 0–0.12)
BILIRUB SERPL-MCNC: 0.8 MG/DL (ref 0.1–1.5)
BUN SERPL-MCNC: 16 MG/DL (ref 8–22)
CALCIUM ALBUM COR SERPL-MCNC: 9.6 MG/DL (ref 8.5–10.5)
CALCIUM SERPL-MCNC: 9 MG/DL (ref 8.5–10.5)
CHLORIDE SERPL-SCNC: 97 MMOL/L (ref 96–112)
CO2 SERPL-SCNC: 26 MMOL/L (ref 20–33)
CREAT SERPL-MCNC: 0.74 MG/DL (ref 0.5–1.4)
EOSINOPHIL # BLD AUTO: 0.3 K/UL (ref 0–0.51)
EOSINOPHIL NFR BLD: 3.3 % (ref 0–6.9)
ERYTHROCYTE [DISTWIDTH] IN BLOOD BY AUTOMATED COUNT: 40.5 FL (ref 35.9–50)
GFR SERPLBLD CREATININE-BSD FMLA CKD-EPI: 102 ML/MIN/1.73 M 2
GLOBULIN SER CALC-MCNC: 4 G/DL (ref 1.9–3.5)
GLUCOSE SERPL-MCNC: 138 MG/DL (ref 65–99)
HCT VFR BLD AUTO: 46.1 % (ref 42–52)
HGB BLD-MCNC: 15.8 G/DL (ref 14–18)
IMM GRANULOCYTES # BLD AUTO: 0.06 K/UL (ref 0–0.11)
IMM GRANULOCYTES NFR BLD AUTO: 0.7 % (ref 0–0.9)
LYMPHOCYTES # BLD AUTO: 3.17 K/UL (ref 1–4.8)
LYMPHOCYTES NFR BLD: 34.3 % (ref 22–41)
MCH RBC QN AUTO: 31 PG (ref 27–33)
MCHC RBC AUTO-ENTMCNC: 34.3 G/DL (ref 33.7–35.3)
MCV RBC AUTO: 90.4 FL (ref 81.4–97.8)
MONOCYTES # BLD AUTO: 0.88 K/UL (ref 0–0.85)
MONOCYTES NFR BLD AUTO: 9.5 % (ref 0–13.4)
NEUTROPHILS # BLD AUTO: 4.72 K/UL (ref 1.82–7.42)
NEUTROPHILS NFR BLD: 51.1 % (ref 44–72)
NRBC # BLD AUTO: 0 K/UL
NRBC BLD-RTO: 0 /100 WBC
PHOSPHATE SERPL-MCNC: 3.7 MG/DL (ref 2.5–4.5)
PLATELET # BLD AUTO: 344 K/UL (ref 164–446)
PMV BLD AUTO: 8.9 FL (ref 9–12.9)
POTASSIUM SERPL-SCNC: 4.3 MMOL/L (ref 3.6–5.5)
PROT SERPL-MCNC: 7.2 G/DL (ref 6–8.2)
RBC # BLD AUTO: 5.1 M/UL (ref 4.7–6.1)
SODIUM SERPL-SCNC: 132 MMOL/L (ref 135–145)
WBC # BLD AUTO: 9.2 K/UL (ref 4.8–10.8)

## 2023-01-22 PROCEDURE — 80053 COMPREHEN METABOLIC PANEL: CPT

## 2023-01-22 PROCEDURE — 84100 ASSAY OF PHOSPHORUS: CPT

## 2023-01-22 PROCEDURE — 700102 HCHG RX REV CODE 250 W/ 637 OVERRIDE(OP)

## 2023-01-22 PROCEDURE — 99233 SBSQ HOSP IP/OBS HIGH 50: CPT | Mod: GC | Performed by: HOSPITALIST

## 2023-01-22 PROCEDURE — 770006 HCHG ROOM/CARE - MED/SURG/GYN SEMI*

## 2023-01-22 PROCEDURE — 36415 COLL VENOUS BLD VENIPUNCTURE: CPT

## 2023-01-22 PROCEDURE — 700105 HCHG RX REV CODE 258

## 2023-01-22 PROCEDURE — 85025 COMPLETE CBC W/AUTO DIFF WBC: CPT

## 2023-01-22 PROCEDURE — 700111 HCHG RX REV CODE 636 W/ 250 OVERRIDE (IP)

## 2023-01-22 PROCEDURE — A9270 NON-COVERED ITEM OR SERVICE: HCPCS

## 2023-01-22 PROCEDURE — 99253 IP/OBS CNSLTJ NEW/EST LOW 45: CPT | Performed by: SURGERY

## 2023-01-22 RX ADMIN — Medication 5 MG: at 20:47

## 2023-01-22 RX ADMIN — PIPERACILLIN AND TAZOBACTAM 3.38 G: 3; .375 INJECTION, POWDER, LYOPHILIZED, FOR SOLUTION INTRAVENOUS; PARENTERAL at 06:46

## 2023-01-22 RX ADMIN — BUDESONIDE AND FORMOTEROL FUMARATE DIHYDRATE 2 PUFF: 160; 4.5 AEROSOL RESPIRATORY (INHALATION) at 06:42

## 2023-01-22 RX ADMIN — BUDESONIDE AND FORMOTEROL FUMARATE DIHYDRATE 2 PUFF: 160; 4.5 AEROSOL RESPIRATORY (INHALATION) at 18:16

## 2023-01-22 RX ADMIN — AMPICILLIN AND SULBACTAM 3 G: 1; 2 INJECTION, POWDER, FOR SOLUTION INTRAMUSCULAR; INTRAVENOUS at 14:14

## 2023-01-22 RX ADMIN — AMPICILLIN AND SULBACTAM 3 G: 1; 2 INJECTION, POWDER, FOR SOLUTION INTRAMUSCULAR; INTRAVENOUS at 20:20

## 2023-01-22 NOTE — PROGRESS NOTES
[facility] Internal Medicine Daily Progress Note    Date of Service  1/22/2023    [facility] Team: [facility] IM White Team   Attending: Joni Nieves M.d.  Senior Resident: Dr. De Los Santos  Intern:  Dr. Noe  Contact Number: [phone]    Chief Complaint  Jason Yip is a 62 y.o. male admitted 1/20/2023 with abdominal pain.     Hospital Course  Jason Yip is a 62 y.o. male who presented 1/20/2023 abdominal pain.   He has a past medical history of cirrhosis (seen on imaging) COPD and polysubstance use (however patient states that he quit several weeks ago). Patient initially presented to [facility] in [facility] on 1/4/2023 reporting 2 months of difficulty with urination. On initial presentation he was found to be septic with a leukocytosis of over 14,000.  At the time CT abdomen pelvis revealed severe sigmoid diverticulitis with inflammatory phlegmon formation and possible colovesicular fistula. At that time he was treated with ertapenem, Flagyl, and also treated for alcohol withdrawal.  Surgery was consulted and recommended no surgical intervention at the time.   He later presented to [facility] again on 1/15/2023 for evaluation of lower abdominal pain and bubbles in his urine.  Urine cultures at the time revealed resistance to ciprofloxacin.  Repeat CT revealed sigmoid colovesicular fistula and patient was transferred to [facility] for higher level of care on 1/17/2023.  (Records not available from [facility]) Per patient while he was at [facility] he was told that he required IV antibiotics and surgical intervention on outpatient basis after antibiotic treatment for 2 weeks and was discharged on 1/19/2023. Patient states that he was discharged on p.o. Augmentin with a Taxi voucher that took him somewhere in [place] rather than back to [place] and he has been homeless since then. He presented to [facility] and was discharged back to the  shelter last night. He was brought by ambulance from [facility] for continued abd pain.     Interval Problem Update  -last night fell while he was in the shower and hit the back of his R arm and the side of his L knee. This morning seen again, ambulating well without difficulty.   -Consulted surgery, they recommended IV abx and re-imaging in about 5 days (1/26)   -Dced Zosyn. Started Unasyn     I have discussed this patient's plan of care and discharge plan at IDT rounds today with Case Management, Nursing, Nursing leadership, and other members of the IDT team.    Consultants/Specialty  general surgery    Code Status  Full Code    Disposition  Patient is not medically cleared for discharge.   Anticipate discharge to to home with close outpatient follow-up.  I have placed the appropriate orders for post-discharge needs.    Review of Systems  Review of Systems   Constitutional:  Negative for chills, fever and malaise/fatigue.   HENT:  Negative for congestion and sore throat.    Respiratory:  Negative for cough, sputum production, shortness of breath and stridor.    Cardiovascular:  Negative for chest pain, palpitations and orthopnea.   Gastrointestinal:  Negative for abdominal pain, blood in stool, constipation, diarrhea, heartburn, melena, nausea and vomiting.   Genitourinary:  Negative for dysuria, flank pain, frequency, hematuria and urgency.   Musculoskeletal:  Positive for myalgias. Negative for back pain and neck pain.        Chronic cramping of hands and feet bilat.    Neurological:  Negative for dizziness, tremors, weakness and headaches.      Physical Exam  Temp:  [36.2 °C (97.2 °F)-37.3 °C (99.1 °F)] 36.3 °C (97.3 °F)  Pulse:  [] 91  Resp:  [17-20] 18  BP: (116-134)/(63-90) 116/63  SpO2:  [92 %-96 %] 92 %    Physical Exam  Vitals and nursing note reviewed.   Constitutional:       General: He is not in acute distress.     Appearance: Normal appearance. He is normal weight. He is not ill-appearing or  toxic-appearing.    Cardiovascular:      Rate and Rhythm: Normal rate and regular rhythm.      Pulses: Normal pulses.      Heart sounds: Normal heart sounds. No murmur heard.    No friction rub. No gallop.   Pulmonary:      Effort: Pulmonary effort is normal. No respiratory distress.      Breath sounds: Normal breath sounds. No stridor. No wheezing, rhonchi or rales.   Abdominal:      General: Abdomen is flat. Bowel sounds are normal. There is no distension.      Palpations: Abdomen is soft.      Tenderness: There is no abdominal tenderness. There is no guarding or rebound.   Musculoskeletal:         General: No swelling or deformity.      Right lower leg: No edema.      Left lower leg: No edema.   Neurological:      Mental Status: He is alert and oriented to person, place, and time.     Fluids    Intake/Output Summary (Last 24 hours) at 1/22/2023 0715  Last data filed at 1/21/2023 2200  Gross per 24 hour   Intake 360 ml   Output --   Net 360 ml       Laboratory  Recent Labs     01/20/23  1818   WBC 13.4*   RBC 5.32   HEMOGLOBIN 16.6   HEMATOCRIT 48.6   MCV 91.4   MCH 31.2   MCHC 34.2   RDW 41.3   PLATELETCT 363   MPV 9.2     Recent Labs     01/20/23  1818   SODIUM 128*   POTASSIUM 4.0   CHLORIDE 92*   CO2 24   GLUCOSE 94   BUN 17   CREATININE 0.74   CALCIUM 9.4                   Imaging  CT-ABDOMEN-PELVIS WITH    Result Date: 1/21/2023  1.  Changes of sigmoid diverticulitis, small collection of fluid with focus of air adjacent to sigmoid colon is seen compatible with microperforation and small diverticular abscess. 2.  Colovesicular fistula is seen with air in the bladder. 3.  Irregular hepatic contour favoring changes of cirrhosis 4.  Segment 2 duodenal diverticula 5.  Small fat-containing right inguinal hernia 6.  Left nephrolithiasis 7.  Cholelithiasis 8.  Atherosclerosis These findings were discussed with the patient's clinician, Kenji Coronel, on 1/21/2023 12:22 AM.    CT-RENAL COLIC EVALUATION(A/P  W/O)    Result Date: 1/4/2023  IMPRESSION: 1. Severe sigmoid diverticulosis with severe diverticulitis with inflammatory phlegmon along the right with inflammatory changes extending to the dome of the bladder and 2 adjacent loop of small bowel. No drainable abscess is present. 2. These findings were discussed with Dr. Gore shortly following completion of the study.    Electronically signed by: Carlos Taylor 1/4/2023 11:39 AM       [id number] VIEW    Result Date: 1/15/2023  IMPRESSION: 1. Mild adynamic ileus.       Electronically signed by: Carlos aTylor 1/15/2023 12:52 PM       DX-CHEST-PORTABLE (1 VIEW)    Result Date: 1/15/2023  IMPRESSION: 1. No evidence of acute process in the chest.    Electronically signed by: Carlos Taylor 1/15/2023 12:50 PM       CT ABDOMEN-PELVIS WITH IV CONTRAST    Result Date: 1/15/2023  IMPRESSION: 1. Severe sigmoid diverticulitis with probable multiple small abscesses in the surrounding tissues. No large drainable abscess is seen. The inflammatory changes extend to the bladder. Air in the bladder is consistent with a sigmoid colon bladder fistula. 2. Tiny gallstone without evidence of acute cholecystitis or bile duct dilatation. 3. Splenomegaly without focal abnormality.    Electronically signed by: Carlos Taylor 1/15/2023 4:25 PM       CT-CTA CHEST PULMONARY ARTERY W/ RECONS    Result Date: 1/16/2023  IMPRESSION: 1. No evidence of pulmonary emboli. 2. New subsegmental atelectasis right lower lobe.    Electronically signed by: Carlos Taylor 1/16/2023 12:15 PM           Assessment/Plan  Problem Representation:    * Colovesical fistula  Assessment & Plan  -Seen on imaging on 117 and again on 1/21.  Per Dr. Leong who evaluated patient at [facility] 1/17, recommended 2 weeks of antibiotics prior to proceeding with any surgery.  -Prior cultures revealed resistance to ciprofloxacin.   -General surgery Dr. Hu consulted and recommended  IV abx and repeat imaging 5 days.   -Advanced to regular diet.   -Dced Zosyn, started Unasyn.      Complicated Diverticulitis- (present on admission)  Assessment & Plan  -Complicated by multiple phlegmon and colovesicular fistula  Plan:   -IV Unasyn   -advanced to regular diet.   -repeat CT in about 5 days (1/26)      Bacteremia  Assessment & Plan  -Per outside hospital records blood cultures positive for gram positive cocci  -Repeat blood cultures  -Requested [facility] records      History of methamphetamine use  Assessment & Plan  -Patient denies any use in the past several weeks  -Encourage continued sobriety     Nephrolithiasis  Assessment & Plan  -Nonobstructing nephrolithiasis , incidental finding on imaging.  -Continue to monitor renal function and urine output     Cholelithiasis  Assessment & Plan  -Asymptomatic  -Outpatient follow up     Hyponatremia  Assessment & Plan  -128 on admission  -Likely secondary to GI losses and possible underlying cirrhosis  -Currently asymptomatic  -Continue to monitor     COPD (chronic obstructive pulmonary disease) (HCC)  Assessment & Plan  -No PFTs on file  -Chest x-ray clear on admission, no wheezing on exam  -Symbicort daily  -As needed albuterol     Alcoholic cirrhosis of liver without ascites (HCC)  Assessment & Plan  -Cirrhotic changes seen on imaging  -Patient is mildly distended on exam however no clear signs of ascites.  No hepatic encephalopathy at -this time or any signs of melena      VTE prophylaxis: SCDs/TEDs    I have performed a physical exam and reviewed and updated ROS and Plan today (1/22/2023). In review of yesterday's note (1/21/2023), there are no changes except as documented above.

## 2023-01-22 NOTE — HOSPITAL COURSE
Jason Yip is a 62 y.o. male who presented 1/20/2023 abdominal pain.   He has a past medical history of cirrhosis (seen on imaging) COPD and polysubstance use (however patient states that he quit several weeks ago). Patient initially presented to [facility] in [facility] on 1/4/2023 reporting 2 months of difficulty with urination. On initial presentation he was found to be septic with a leukocytosis of over 14,000.  At the time CT abdomen pelvis revealed severe sigmoid diverticulitis with inflammatory phlegmon formation and possible colovesicular fistula. At that time he was treated with ertapenem, Flagyl, and also treated for alcohol withdrawal.  Surgery was consulted and recommended no surgical intervention at the time.   He later presented to [facility] again on 1/15/2023 for evaluation of lower abdominal pain and bubbles in his urine.  Urine cultures at the time revealed resistance to ciprofloxacin.  Repeat CT revealed sigmoid colovesicular fistula and patient was transferred to [facility] for higher level of care on 1/17/2023.  (Records not available from [facility]) Per patient while he was at [facility] he was told that he required IV antibiotics and surgical intervention on outpatient basis after antibiotic treatment for 2 weeks and was discharged on 1/19/2023. Patient states that he was discharged on p.o. Augmentin with a Taxi voucher that took him somewhere in [place] rather than back to [place] and he has been homeless since then. He presented to [facility] and was discharged back to the shelter 1/19. He was brought by ambulance from [facility] on 1/20 for continued abd pain.     While admitted here general surgery was consulted and recommended liquid diet, broad spectrum abx and plan for possible sigmoid resection by Dr. Leong. Urine culture from admission was positive for ESBL e coli on 1/23 so  patient was started on Meropenem. On 1/28 he underwent sigmoid colon resection with primary anastomosis and takedown of colovesicular fistula. On 1/28 patient underwent sigmoid colon resection and take down of colovesicular fistula. His course of IV Meropenem was completed 2/2.

## 2023-01-22 NOTE — CONSULTS
DATE OF CONSULTATION:  1/22/2023     REFERRING PHYSICIAN:   Joni Nieves M.D.     CONSULTING PHYSICIAN:  Melvina Hu M.D.     REASON FOR CONSULTATION:  I have been asked by  to see the patient in surgical consultation for evaluation of complex diverticulitis.    HISTORY OF PRESENT ILLNESS:   This is a patient from [facility], who has been having abdominal pain for several weeks.  In [place] he had a course of oral antibiotics, but presented back to his local emergency department last weekend due to pain.  He was found to have multiple scalp abscesses and phlegmon surrounding his colon.  He was also noted to have a colovesicular fistula.  He was unable to be treated by the surgeon there and so was transferred to [facility] last week.  He received several days of IV antibiotics and was improving and the plan was for him to discharge home and return for a elective 1 stage operation.  However apparently his transport dropped him off somewhere else in [place].  He presented to [place] for help and was discharged to the shelter.  Last night he began having severe pain and so presented to the emergency department here for help.  He has a low grade leukocytosis, no fevers.  His CT continues to show some inflammation and fluid collections around his colon, though from the reads it sounds like it has improved from last week when he was at [facility].  He is feeling much better this morning.  He tells me he was not having any pain when eating the last few weeks.  He was seen by Dr. Leong last week who recommended several weeks of antibiotics to allow the inflammation to settle down, to allow a minimally invasive 1 stage operation.    PAST MEDICAL HISTORY:  has a past medical history of Chronic obstructive pulmonary disease (HCC).    PAST SURGICAL HISTORY:  has no past surgical history on file.    ALLERGIES:   Allergies   Allergen Reactions    Keflex Hives        CURRENT MEDICATIONS:    Home Medications       Reviewed by Heena Avendaño R.N. (Registered Nurse) on 01/21/23 at 0245  Med List Status: Not Addressed     Medication Last Dose Status        Patient Reji Taking any Medications                           FAMILY HISTORY: family history is not on file.    SOCIAL HISTORY:  reports that he has been smoking cigarettes. He has been smoking an average of .5 packs per day. He does not have any smokeless tobacco history on file. He reports current alcohol use of about 72.0 oz per week.    REVIEW OF SYSTEMS: Review of systems is remarkable for the following abdominal pain. The remainder of the comprehensive ROS is negative with the exception of the aforementioned HPI, PMH, and PSH bullets in accordance with CMS guideline.    PHYSICAL EXAMINATION:    Physical Exam  Constitutional:       Appearance: Normal appearance.   HENT:      Head: Normocephalic and atraumatic.      Right Ear: External ear normal.      Left Ear: External ear normal.      Mouth/Throat:      Mouth: Mucous membranes are moist.   Eyes:      Extraocular Movements: Extraocular movements intact.   Cardiovascular:      Rate and Rhythm: Normal rate and regular rhythm.   Pulmonary:      Effort: Pulmonary effort is normal.   Abdominal:      General: Abdomen is flat. There is no distension.      Tenderness: There is no abdominal tenderness. There is no guarding or rebound.   Musculoskeletal:         General: No swelling or tenderness. Normal range of motion.      Cervical back: Normal range of motion.   Skin:     General: Skin is warm and dry.      Capillary Refill: Capillary refill takes less than 2 seconds.   Neurological:      General: No focal deficit present.      Mental Status: He is alert and oriented to person, place, and time.   Psychiatric:         Mood and Affect: Mood normal.         Behavior: Behavior normal.       LABORATORY VALUES:   Recent Labs     01/20/23  1818 01/22/23  0731   WBC 13.4* 9.2   RBC  5.32 5.10   HEMOGLOBIN 16.6 15.8   HEMATOCRIT 48.6 46.1   MCV 91.4 90.4   MCH 31.2 31.0   MCHC 34.2 34.3   RDW 41.3 40.5   PLATELETCT 363 344   MPV 9.2 8.9*     Recent Labs     01/20/23  1818 01/22/23  0731   SODIUM 128* 132*   POTASSIUM 4.0 4.3   CHLORIDE 92* 97   CO2 24 26   GLUCOSE 94 138*   BUN 17 16   CREATININE 0.74 0.74   CALCIUM 9.4 9.0     Recent Labs     01/20/23  1818 01/22/23  0731   ASTSGOT 54* 40   ALTSGPT 43 38   TBILIRUBIN 1.3 0.8   ALKPHOSPHAT 144* 141*   GLOBULIN 4.6* 4.0*            IMAGING:   CT-ABDOMEN-PELVIS WITH   Final Result         1.  Changes of sigmoid diverticulitis, small collection of fluid with focus of air adjacent to sigmoid colon is seen compatible with microperforation and small diverticular abscess.   2.  Colovesicular fistula is seen with air in the bladder.   3.  Irregular hepatic contour favoring changes of cirrhosis   4.  Segment 2 duodenal diverticula   5.  Small fat-containing right inguinal hernia   6.  Left nephrolithiasis   7.  Cholelithiasis   8.  Atherosclerosis      These findings were discussed with the patient's clinician, Kenji Coronel, on 1/21/2023 12:22 AM.          ASSESSMENT AND PLAN:   62 year old man with colovesicular fistula with associated with acute complicated diverticulitis.     Recommend IV antibiotics and re-scan in about 5 days to evaluate for improvement. Symptoms have reportedly improved clinically over the past week since he's been in [place]. Improvement/resolution of the acute diverticulitis would allow a better chance at having a minimally invasive one stage operation.     Discussed with patient that our only other option at this point is to do an open surgery and colostomy placement. After discussion we are in agreement that he would like a prolonged antibiotic course to try to avoid that.     Ok from my standpoint for a regular diet.        ____________________________________     Melvina Hu M.D.    DD: 1/22/2023  9:36 AM    ACS  NSQIP Surgical Risk Calculator

## 2023-01-22 NOTE — PROGRESS NOTES
Called to bedside after patient fell in the shower  Upon evaluation, patient reports hitting the back of his right arm, and the side of his left knee. He was AxO4, conversational, moving all extremities. Injury sites without contusion or bleeding. Some tenderness with palpation of the back of the right arm.      Plan  Motrin  Fall precautions  Nurse agreeable to another nuero check in 30 minutes and then 1 hour. Will notify provider of acute changes

## 2023-01-22 NOTE — PROGRESS NOTES
Patient was assisted to bathroom for shower. Staff heard patient yell, then came to bathroom and opened door, and patient was found on floor of bathroom with shower still on. Patient refused to stop showering for assessment. After he was finished showering and out of bathroom, an assessment was performed along with vitals, and then documented. Patient complained of knee and right arm pain, but no visible injuries could be identified on inspection. MD was notified. Suggested be alarm and treaded socks, patient not interested.

## 2023-01-22 NOTE — CARE PLAN
The patient is Stable - Low risk of patient condition declining or worsening    Shift Goals  Clinical Goals: Encourage patient to finish ABX  Patient Goals: Halt elimination frequency  Family Goals: STEW    Progress made toward(s) clinical / shift goals:  Patient progressing towards goals. Call light within reach. Hourly rounding.     Patient is not progressing towards the following goals:      Problem: Pain - Standard  Goal: Alleviation of pain or a reduction in pain to the patient’s comfort goal  Outcome: Progressing     Problem: Knowledge Deficit - Standard  Goal: Patient and family/care givers will demonstrate understanding of plan of care, disease process/condition, diagnostic tests and medications  Outcome: Progressing     Problem: Psychosocial Needs:  Goal: Ability to cope will improve  Outcome: Progressing     Problem: Urinary Elimination:  Goal: Ability to achieve and maintain adequate renal perfusion and functioning will improve  Outcome: Progressing  Goal: Ability to achieve a balanced intake and output will improve  Outcome: Progressing     Problem: Physical Regulation:  Goal: Diagnostic test results will improve  Outcome: Progressing     Problem: Knowledge Deficit:  Goal: Patient's knowledge of the prescribed therapeutic regimen will improve  Outcome: Progressing     Problem: Psychosocial  Goal: Patient's level of anxiety will decrease  Outcome: Progressing  Goal: Patient's ability to verbalize feelings about condition will improve  Outcome: Progressing  Goal: Patient's ability to re-evaluate and adapt role responsibilities will improve  Outcome: Progressing  Goal: Patient and family will demonstrate ability to cope with life altering diagnosis and/or procedure  Outcome: Progressing  Goal: Spiritual and cultural needs incorporated into hospitalization  Outcome: Progressing     Problem: Communication  Goal: The ability to communicate needs accurately and effectively will improve  Outcome: Progressing      Problem: Discharge Barriers/Planning  Goal: Patient's continuum of care needs are met  Outcome: Progressing     Problem: Hemodynamics  Goal: Patient's hemodynamics, fluid balance and neurologic status will be stable or improve  Outcome: Progressing     Problem: Respiratory  Goal: Patient will achieve/maintain optimum respiratory ventilation and gas exchange  Outcome: Progressing     Problem: Chest Tube Management  Goal: Complications related to chest tube will be avoided or minimized  Outcome: Progressing     Problem: Fluid Volume  Goal: Fluid volume balance will be maintained  Outcome: Progressing     Problem: Mechanical Ventilation  Goal: Safe management of artificial airway and ventilation  Outcome: Progressing  Goal: Successful weaning off mechanical ventilator, spontaneously maintains adequate gas exchange  Outcome: Progressing  Goal: Patient will be able to express needs and understand communication  Outcome: Progressing     Problem: Dysphagia  Goal: Dysphagia will improve  Outcome: Progressing     Problem: Risk for Aspiration  Goal: Patient's risk for aspiration will be absent or decrease  Outcome: Progressing     Problem: Nutrition  Goal: Patient's nutritional and fluid intake will be adequate or improve  Outcome: Progressing  Goal: Enteral nutrition will be maintained or improve  Outcome: Progressing  Goal: Enteral nutrition will be maintained or improve  Outcome: Progressing     Problem: Urinary Elimination  Goal: Establish and maintain regular urinary output  Outcome: Progressing     Problem: Bowel Elimination  Goal: Establish and maintain regular bowel function  Outcome: Progressing     Problem: Gastrointestinal Irritability  Goal: Nausea and vomiting will be absent or improve  Outcome: Progressing  Goal: Diarrhea will be absent or improved  Outcome: Progressing     Problem: Rectal Tube  Goal: Fecal output will be contained and skin will remain free from irritation  Outcome: Progressing     Problem:  Mobility  Goal: Patient's capacity to carry out activities will improve  Outcome: Progressing     Problem: Self Care  Goal: Patient will have the ability to perform ADLs independently or with assistance (bathe, groom, dress, toilet and feed)  Outcome: Progressing     Problem: Infection - Standard  Goal: Patient will remain free from infection  Outcome: Progressing     Problem: Wound/ / Incision Healing  Goal: Patient's wound/surgical incision will decrease in size and heals properly  Outcome: Progressing     Problem: Fall Risk  Goal: Patient will remain free from falls  Outcome: Progressing

## 2023-01-22 NOTE — CARE PLAN
Problem: Pain - Standard  Goal: Alleviation of pain or a reduction in pain to the patient’s comfort goal  1/21/2023 1616 by Justyna Esquivel R.N.  Outcome: Progressing  1/21/2023 1615 by Justyna Esquivel R.N.  Outcome: Progressing     Problem: Knowledge Deficit - Standard  Goal: Patient and family/care givers will demonstrate understanding of plan of care, disease process/condition, diagnostic tests and medications  1/21/2023 1616 by Justyna Esquivel R.N.  Outcome: Progressing  1/21/2023 1615 by Justyna Esquivel R.N.  Outcome: Progressing     Problem: Psychosocial Needs:  Goal: Ability to cope will improve  Outcome: Progressing     Problem: Urinary Elimination:  Goal: Ability to achieve and maintain adequate renal perfusion and functioning will improve  Outcome: Progressing  Goal: Ability to achieve a balanced intake and output will improve  Outcome: Progressing     Problem: Physical Regulation:  Goal: Diagnostic test results will improve  Outcome: Progressing     Problem: Knowledge Deficit:  Goal: Patient's knowledge of the prescribed therapeutic regimen will improve  Outcome: Progressing     Problem: Psychosocial  Goal: Patient's level of anxiety will decrease  Outcome: Progressing  Goal: Patient's ability to verbalize feelings about condition will improve  Outcome: Progressing  Goal: Patient's ability to re-evaluate and adapt role responsibilities will improve  Outcome: Progressing  Goal: Patient and family will demonstrate ability to cope with life altering diagnosis and/or procedure  Outcome: Progressing  Goal: Spiritual and cultural needs incorporated into hospitalization  Outcome: Progressing     Problem: Communication  Goal: The ability to communicate needs accurately and effectively will improve  Outcome: Progressing     Problem: Discharge Barriers/Planning  Goal: Patient's continuum of care needs are met  Outcome: Progressing     Problem: Hemodynamics  Goal: Patient's hemodynamics, fluid balance and  neurologic status will be stable or improve  Outcome: Progressing     Problem: Respiratory  Goal: Patient will achieve/maintain optimum respiratory ventilation and gas exchange  Outcome: Progressing     Problem: Chest Tube Management  Goal: Complications related to chest tube will be avoided or minimized  Outcome: Progressing     Problem: Fluid Volume  Goal: Fluid volume balance will be maintained  Outcome: Progressing     Problem: Mechanical Ventilation  Goal: Safe management of artificial airway and ventilation  Outcome: Progressing  Goal: Successful weaning off mechanical ventilator, spontaneously maintains adequate gas exchange  Outcome: Progressing  Goal: Patient will be able to express needs and understand communication  Outcome: Progressing     Problem: Dysphagia  Goal: Dysphagia will improve  Outcome: Progressing     Problem: Risk for Aspiration  Goal: Patient's risk for aspiration will be absent or decrease  Outcome: Progressing     Problem: Nutrition  Goal: Patient's nutritional and fluid intake will be adequate or improve  Outcome: Progressing  Goal: Enteral nutrition will be maintained or improve  Outcome: Progressing  Goal: Enteral nutrition will be maintained or improve  Outcome: Progressing     Problem: Urinary Elimination  Goal: Establish and maintain regular urinary output  Outcome: Progressing     Problem: Bowel Elimination  Goal: Establish and maintain regular bowel function  Outcome: Progressing     Problem: Gastrointestinal Irritability  Goal: Nausea and vomiting will be absent or improve  Outcome: Progressing  Goal: Diarrhea will be absent or improved  Outcome: Progressing     Problem: Rectal Tube  Goal: Fecal output will be contained and skin will remain free from irritation  Outcome: Progressing     Problem: Mobility  Goal: Patient's capacity to carry out activities will improve  Outcome: Progressing     Problem: Self Care  Goal: Patient will have the ability to perform ADLs independently or  with assistance (bathe, groom, dress, toilet and feed)  Outcome: Progressing     Problem: Infection - Standard  Goal: Patient will remain free from infection  Outcome: Progressing     Problem: Wound/ / Incision Healing  Goal: Patient's wound/surgical incision will decrease in size and heals properly  Outcome: Progressing   The patient is Stable - Low risk of patient condition declining or worsening    Shift Goals  Clinical Goals: ATB thearpy  Patient Goals: na  Family Goals: na    Progress made toward(s) clinical / shift goals:  Patient anxious about disease process. Educated but doubtful he understood. Will cont to inform him. Patient loud and on phone constantly and yelling out obscenities. Will  patient on hospital's need to keep a quiet environment.     Patient is not progressing towards the following goals: n/a

## 2023-01-23 LAB
BACTERIA UR CULT: ABNORMAL
BACTERIA UR CULT: ABNORMAL
SIGNIFICANT IND 70042: ABNORMAL
SITE SITE: ABNORMAL
SOURCE SOURCE: ABNORMAL

## 2023-01-23 PROCEDURE — 700105 HCHG RX REV CODE 258

## 2023-01-23 PROCEDURE — 99233 SBSQ HOSP IP/OBS HIGH 50: CPT | Mod: GC | Performed by: HOSPITALIST

## 2023-01-23 PROCEDURE — A9270 NON-COVERED ITEM OR SERVICE: HCPCS

## 2023-01-23 PROCEDURE — 700111 HCHG RX REV CODE 636 W/ 250 OVERRIDE (IP)

## 2023-01-23 PROCEDURE — 770006 HCHG ROOM/CARE - MED/SURG/GYN SEMI*

## 2023-01-23 PROCEDURE — 99233 SBSQ HOSP IP/OBS HIGH 50: CPT | Performed by: SURGERY

## 2023-01-23 PROCEDURE — 700102 HCHG RX REV CODE 250 W/ 637 OVERRIDE(OP)

## 2023-01-23 RX ORDER — NITROFURANTOIN 25; 75 MG/1; MG/1
100 CAPSULE ORAL 2 TIMES DAILY WITH MEALS
Status: DISCONTINUED | OUTPATIENT
Start: 2023-01-23 | End: 2023-01-23

## 2023-01-23 RX ADMIN — AMPICILLIN AND SULBACTAM 3 G: 1; 2 INJECTION, POWDER, FOR SOLUTION INTRAMUSCULAR; INTRAVENOUS at 00:39

## 2023-01-23 RX ADMIN — MEROPENEM 500 MG: 500 INJECTION, POWDER, FOR SOLUTION INTRAVENOUS at 19:57

## 2023-01-23 RX ADMIN — Medication 5 MG: at 20:27

## 2023-01-23 RX ADMIN — AMPICILLIN AND SULBACTAM 3 G: 1; 2 INJECTION, POWDER, FOR SOLUTION INTRAMUSCULAR; INTRAVENOUS at 08:25

## 2023-01-23 RX ADMIN — MEROPENEM 500 MG: 500 INJECTION, POWDER, FOR SOLUTION INTRAVENOUS at 14:05

## 2023-01-23 RX ADMIN — BUDESONIDE AND FORMOTEROL FUMARATE DIHYDRATE 2 PUFF: 160; 4.5 AEROSOL RESPIRATORY (INHALATION) at 08:25

## 2023-01-23 RX ADMIN — BUDESONIDE AND FORMOTEROL FUMARATE DIHYDRATE 2 PUFF: 160; 4.5 AEROSOL RESPIRATORY (INHALATION) at 17:26

## 2023-01-23 NOTE — PROGRESS NOTES
Received bedside report from night shift RN.   Assumed care of patient at change of shift.   Assessment complete and POC discussed.   Patient is A&Ox4, on RA.   Denies pain, no apparent signs of distress or discomfort.   Patient is sitting up in bed for breakfast.   Bed is in lowest/locked position.   Call light and belongings are within reach.   No further needs at this time.   Patient adamantly refusing bed alarm.

## 2023-01-23 NOTE — PROGRESS NOTES
[facility] Daily Progress Note    Date of Service  1/23/2023    [facility] Team: [facility] White Team   Attending: Joni Nieves M.d.  Senior Resident: Dr. De Los Santos  Intern:  Dr. Noe  Contact Number: [phone]    Chief Complaint  Jason Yip is a 62 y.o. male admitted 1/20/2023 with abdominal pain.     Hospital Course  Jason Yip is a 62 y.o. male who presented 1/20/2023 abdominal pain.   He has a past medical history of cirrhosis (seen on imaging) COPD and polysubstance use (however patient states that he quit several weeks ago). Patient initially presented to [facility] in [facility] on 1/4/2023 reporting 2 months of difficulty with urination. On initial presentation he was found to be septic with a leukocytosis of over 14,000.  At the time CT abdomen pelvis revealed severe sigmoid diverticulitis with inflammatory phlegmon formation and possible colovesicular fistula. At that time he was treated with ertapenem, Flagyl, and also treated for alcohol withdrawal.  Surgery was consulted and recommended no surgical intervention at the time.   He later presented to [facility] again on 1/15/2023 for evaluation of lower abdominal pain and bubbles in his urine.  Urine cultures at the time revealed resistance to ciprofloxacin.  Repeat CT revealed sigmoid colovesicular fistula and patient was transferred to [facility] for higher level of care on 1/17/2023.  (Records not available from [facility]) Per patient while he was at [facility] he was told that he required IV antibiotics and surgical intervention on outpatient basis after antibiotic treatment for 2 weeks and was discharged on 1/19/2023. Patient states that he was discharged on p.o. Augmentin with a Taxi voucher that took him somewhere in [place] rather than back to [place] and he has been homeless since then. He presented to [facility] and was discharged back to the  shelter last night. He was brought by ambulance from [facility] for continued abd pain.     Interval Problem Update  Patient said he would like the surgery and would like to get this over with and go home. However, he said he would not like an ostomy. Explained plan to continue IV ABX and repeat imaging and he expressed understanding.   -inspected urine sample saved by pt. Small piece of mucous in urine, otherwise unremarkable.   -Yesterday had switched pt to Unasyn, however urine cx resulted this morning as ESBL ecoli. Switched pt to Meropenem.   -Repeat CT 1/26.     I have discussed this patient's plan of care and discharge plan at IDT rounds today with Case Management, Nursing, Nursing leadership, and other members of the IDT team.    Consultants/Specialty  general surgery    Code Status  Full Code    Disposition  Patient is not medically cleared for discharge.   Anticipate discharge to to home with close outpatient follow-up.  I have placed the appropriate orders for post-discharge needs.    Review of Systems  Review of Systems   Constitutional:  Negative for chills, fever and malaise/fatigue.   HENT:  Negative for congestion and sore throat.    Respiratory:  Negative for cough, sputum production, shortness of breath and stridor.    Cardiovascular:  Negative for chest pain, palpitations and orthopnea.   Gastrointestinal:  Negative for abdominal pain, blood in stool, constipation, diarrhea, heartburn, melena, nausea and vomiting.   Genitourinary:  Negative for dysuria, flank pain, frequency, hematuria and urgency.   Musculoskeletal:  Negative for myalgias, back pain and neck pain.        Myalgias resolved.   Neurological:  Negative for dizziness, tremors, weakness and headaches.      Physical Exam  Temp:  [36.4 °C (97.6 °F)-36.9 °C (98.4 °F)] 36.5 °C (97.7 °F)  Pulse:  [52-94] 79  Resp:  [16-18] 18  BP: (108-128)/(71-85) 123/71  SpO2:  [94 %-97 %] 95 %    Physical Exam  Vitals and nursing note reviewed.    Constitutional:       General: He is not in acute distress.     Appearance: Normal appearance. He is normal weight. He is not ill-appearing or toxic-appearing.    Cardiovascular:      Rate and Rhythm: Normal rate and regular rhythm.      Pulses: Normal pulses.      Heart sounds: Normal heart sounds. No murmur heard.    No friction rub. No gallop.   Pulmonary:      Effort: Pulmonary effort is normal. No respiratory distress.      Breath sounds: Normal breath sounds. No stridor. No wheezing, rhonchi or rales.   Abdominal:      General: Abdomen is flat. Bowel sounds are normal. There is no distension.      Palpations: Abdomen is soft.      Tenderness: There is no abdominal tenderness. There is no guarding or rebound.   Musculoskeletal:         General: No swelling or deformity.      Right lower leg: No edema.      Left lower leg: No edema.   Neurological:      Mental Status: He is alert and oriented to person, place, and time.     Fluids    Intake/Output Summary (Last 24 hours) at 1/23/2023 0715  Last data filed at 1/22/2023 1800  Gross per 24 hour   Intake 1558 ml   Output --   Net 1558 ml       Laboratory  Recent Labs     01/20/23  1818 01/22/23  0731   WBC 13.4* 9.2   RBC 5.32 5.10   HEMOGLOBIN 16.6 15.8   HEMATOCRIT 48.6 46.1   MCV 91.4 90.4   MCH 31.2 31.0   MCHC 34.2 34.3   RDW 41.3 40.5   PLATELETCT 363 344   MPV 9.2 8.9*     Recent Labs     01/20/23  1818 01/22/23  0731   SODIUM 128* 132*   POTASSIUM 4.0 4.3   CHLORIDE 92* 97   CO2 24 26   GLUCOSE 94 138*   BUN 17 16   CREATININE 0.74 0.74   CALCIUM 9.4 9.0                   Imaging  CT-ABDOMEN-PELVIS WITH    Result Date: 1/21/2023  1.  Changes of sigmoid diverticulitis, small collection of fluid with focus of air adjacent to sigmoid colon is seen compatible with microperforation and small diverticular abscess. 2.  Colovesicular fistula is seen with air in the bladder. 3.  Irregular hepatic contour favoring changes of cirrhosis 4.  Segment 2 duodenal  diverticula 5.  Small fat-containing right inguinal hernia 6.  Left nephrolithiasis 7.  Cholelithiasis 8.  Atherosclerosis These findings were discussed with the patient's clinician, Kenji Coronel, on 1/21/2023 12:22 AM.    CT-RENAL COLIC EVALUATION(A/P W/O)    Result Date: 1/4/2023  IMPRESSION: 1. Severe sigmoid diverticulosis with severe diverticulitis with inflammatory phlegmon along the right with inflammatory changes extending to the dome of the bladder and 2 adjacent loop of small bowel. No drainable abscess is present. 2. These findings were discussed with Dr. Gore shortly following completion of the study.    Electronically signed by: Carlos Taylor 1/4/2023 11:39 AM       AS-EONAVVD-5 VIEW    Result Date: 1/15/2023  IMPRESSION: 1. Mild adynamic ileus.       Electronically signed by: Carlos Taylor 1/15/2023 12:52 PM       DX-CHEST-PORTABLE (1 VIEW)    Result Date: 1/15/2023  IMPRESSION: 1. No evidence of acute process in the chest.    Electronically signed by: Carlos Taylor 1/15/2023 12:50 PM       CT ABDOMEN-PELVIS WITH IV CONTRAST    Result Date: 1/15/2023  IMPRESSION: 1. Severe sigmoid diverticulitis with probable multiple small abscesses in the surrounding tissues. No large drainable abscess is seen. The inflammatory changes extend to the bladder. Air in the bladder is consistent with a sigmoid colon bladder fistula. 2. Tiny gallstone without evidence of acute cholecystitis or bile duct dilatation. 3. Splenomegaly without focal abnormality.    Electronically signed by: Carlos Taylor 1/15/2023 4:25 PM       CT-CTA CHEST PULMONARY ARTERY W/ RECONS    Result Date: 1/16/2023  IMPRESSION: 1. No evidence of pulmonary emboli. 2. New subsegmental atelectasis right lower lobe.    Electronically signed by: Carlos Taylor 1/16/2023 12:15 PM           Assessment/Plan  Problem Representation:    * Colovesical fistula  Assessment & Plan  -Seen on imaging on 117 and again on 1/21.  Per   Salo who evaluated patient at [facility] 1/17, recommended 2 weeks of antibiotics prior to proceeding with any surgery.  -Prior cultures revealed resistance to ciprofloxacin.   -General surgery Dr. Hu consulted and recommended IV abx and repeat imaging 5 days.   -Advanced to regular diet.   -Pr received zosyn on admission. Switched to Unasyn 1/22, however urine cx resulted 1/23 as ESBL ecoli. Switched pt to Meropenem.      Complicated Diverticulitis- (present on admission)  Assessment & Plan  -Complicated by multiple phlegmon and colovesicular fistula  Plan:   -IV meropenem   -advanced to regular diet.   -repeat CT in about 5 days (1/26)      Bacteremia (?)  Assessment & Plan  -Per outside hospital records blood cultures positive for gram positive cocci  -Repeat blood cultures negative.   -Requested [facility] records      History of methamphetamine use  Assessment & Plan  -Patient denies any use in the past several weeks  -Encourage continued sobriety     Nephrolithiasis  Assessment & Plan  -Nonobstructing nephrolithiasis , incidental finding on imaging.  -Continue to monitor renal function and urine output     Cholelithiasis  Assessment & Plan  -Asymptomatic  -Outpatient follow up     Hyponatremia  Assessment & Plan  -128 on admission. Improving with PO intake.   -Likely secondary to GI losses and possible underlying cirrhosis  -Currently asymptomatic  -Continue to monitor     COPD (chronic obstructive pulmonary disease) (HCC)  Assessment & Plan  -No PFTs on file  -Chest x-ray clear on admission, no wheezing on exam  -Symbicort daily  -As needed albuterol     Alcoholic cirrhosis of liver without ascites (HCC)  Assessment & Plan  -Cirrhotic changes seen on imaging  -Patient is mildly distended on exam however no clear signs of ascites.  No hepatic encephalopathy at this time.       VTE prophylaxis: SCDs/TEDs    I have performed a physical exam and reviewed and updated ROS and Plan today  (1/23/2023). In review of yesterday's note (1/22/2023), there are no changes except as documented above.

## 2023-01-23 NOTE — CARE PLAN
The patient is Stable - Low risk of patient condition declining or worsening    Shift Goals  Clinical Goals: abx  Patient Goals: rest  Family Goals: STEW    Progress made toward(s) clinical / shift goals:      Patient is not progressing towards the following goals:

## 2023-01-23 NOTE — ASSESSMENT & PLAN NOTE
Associated with acute complicated diverticulitis.   1/28 Sigmoid colon resection with primary anastomosis and takedown of colovesical fistula, mobilization of the splenic flexure  1/31 Advanced to GI soft diet  ACS blue service

## 2023-01-23 NOTE — CARE PLAN
The patient is Stable - Low risk of patient condition declining or worsening    Shift Goals  Clinical Goals: Antibiotics  Patient Goals: rest  Family Goals: STEW    Progress made toward(s) clinical / shift goals:  Patient's antibiotics switched to Meropenem Q6 hours. First dose given this afternoon. Patient tolerating, no reaction noted.      Problem: Pain - Standard  Goal: Alleviation of pain or a reduction in pain to the patient’s comfort goal  Outcome: Progressing     Problem: Knowledge Deficit - Standard  Goal: Patient and family/care givers will demonstrate understanding of plan of care, disease process/condition, diagnostic tests and medications  Outcome: Progressing     Problem: Psychosocial Needs:  Goal: Ability to cope will improve  Outcome: Progressing     Problem: Discharge Barriers/Planning  Goal: Patient's continuum of care needs are met  Outcome: Progressing

## 2023-01-24 LAB
ANION GAP SERPL CALC-SCNC: 10 MMOL/L (ref 7–16)
BASOPHILS # BLD AUTO: 1.4 % (ref 0–1.8)
BASOPHILS # BLD: 0.18 K/UL (ref 0–0.12)
BUN SERPL-MCNC: 13 MG/DL (ref 8–22)
CALCIUM SERPL-MCNC: 8.9 MG/DL (ref 8.5–10.5)
CHLORIDE SERPL-SCNC: 97 MMOL/L (ref 96–112)
CO2 SERPL-SCNC: 26 MMOL/L (ref 20–33)
CREAT SERPL-MCNC: 0.66 MG/DL (ref 0.5–1.4)
EOSINOPHIL # BLD AUTO: 0.39 K/UL (ref 0–0.51)
EOSINOPHIL NFR BLD: 3.1 % (ref 0–6.9)
ERYTHROCYTE [DISTWIDTH] IN BLOOD BY AUTOMATED COUNT: 42.2 FL (ref 35.9–50)
GFR SERPLBLD CREATININE-BSD FMLA CKD-EPI: 106 ML/MIN/1.73 M 2
GLUCOSE SERPL-MCNC: 124 MG/DL (ref 65–99)
HCT VFR BLD AUTO: 46 % (ref 42–52)
HGB BLD-MCNC: 15.7 G/DL (ref 14–18)
IMM GRANULOCYTES # BLD AUTO: 0.12 K/UL (ref 0–0.11)
IMM GRANULOCYTES NFR BLD AUTO: 0.9 % (ref 0–0.9)
LYMPHOCYTES # BLD AUTO: 3.58 K/UL (ref 1–4.8)
LYMPHOCYTES NFR BLD: 28.2 % (ref 22–41)
MCH RBC QN AUTO: 31.4 PG (ref 27–33)
MCHC RBC AUTO-ENTMCNC: 34.1 G/DL (ref 33.7–35.3)
MCV RBC AUTO: 92 FL (ref 81.4–97.8)
MONOCYTES # BLD AUTO: 1.15 K/UL (ref 0–0.85)
MONOCYTES NFR BLD AUTO: 9 % (ref 0–13.4)
NEUTROPHILS # BLD AUTO: 7.29 K/UL (ref 1.82–7.42)
NEUTROPHILS NFR BLD: 57.4 % (ref 44–72)
NRBC # BLD AUTO: 0 K/UL
NRBC BLD-RTO: 0 /100 WBC
OSMOLALITY UR: 627 MOSM/KG H2O (ref 300–900)
PLATELET # BLD AUTO: 375 K/UL (ref 164–446)
PMV BLD AUTO: 9 FL (ref 9–12.9)
POTASSIUM SERPL-SCNC: 4.5 MMOL/L (ref 3.6–5.5)
RBC # BLD AUTO: 5 M/UL (ref 4.7–6.1)
SODIUM SERPL-SCNC: 133 MMOL/L (ref 135–145)
SODIUM UR-SCNC: 159 MMOL/L
WBC # BLD AUTO: 12.7 K/UL (ref 4.8–10.8)

## 2023-01-24 PROCEDURE — 99232 SBSQ HOSP IP/OBS MODERATE 35: CPT | Performed by: SURGERY

## 2023-01-24 PROCEDURE — 700105 HCHG RX REV CODE 258

## 2023-01-24 PROCEDURE — 770001 HCHG ROOM/CARE - MED/SURG/GYN PRIV*

## 2023-01-24 PROCEDURE — 83935 ASSAY OF URINE OSMOLALITY: CPT

## 2023-01-24 PROCEDURE — 84300 ASSAY OF URINE SODIUM: CPT

## 2023-01-24 PROCEDURE — 80048 BASIC METABOLIC PNL TOTAL CA: CPT

## 2023-01-24 PROCEDURE — 36415 COLL VENOUS BLD VENIPUNCTURE: CPT

## 2023-01-24 PROCEDURE — 700111 HCHG RX REV CODE 636 W/ 250 OVERRIDE (IP)

## 2023-01-24 PROCEDURE — 85025 COMPLETE CBC W/AUTO DIFF WBC: CPT

## 2023-01-24 PROCEDURE — A9270 NON-COVERED ITEM OR SERVICE: HCPCS

## 2023-01-24 PROCEDURE — 99233 SBSQ HOSP IP/OBS HIGH 50: CPT | Mod: GC | Performed by: HOSPITALIST

## 2023-01-24 PROCEDURE — 700102 HCHG RX REV CODE 250 W/ 637 OVERRIDE(OP)

## 2023-01-24 RX ORDER — ENOXAPARIN SODIUM 100 MG/ML
40 INJECTION SUBCUTANEOUS DAILY
Status: DISCONTINUED | OUTPATIENT
Start: 2023-01-24 | End: 2023-01-28

## 2023-01-24 RX ORDER — ACETAMINOPHEN 325 MG/1
650 TABLET ORAL EVERY 4 HOURS PRN
Status: DISCONTINUED | OUTPATIENT
Start: 2023-01-24 | End: 2023-01-28

## 2023-01-24 RX ADMIN — MEROPENEM 500 MG: 500 INJECTION, POWDER, FOR SOLUTION INTRAVENOUS at 14:17

## 2023-01-24 RX ADMIN — Medication 5 MG: at 21:24

## 2023-01-24 RX ADMIN — MEROPENEM 500 MG: 500 INJECTION, POWDER, FOR SOLUTION INTRAVENOUS at 08:43

## 2023-01-24 RX ADMIN — MEROPENEM 500 MG: 500 INJECTION, POWDER, FOR SOLUTION INTRAVENOUS at 00:32

## 2023-01-24 RX ADMIN — MEROPENEM 500 MG: 500 INJECTION, POWDER, FOR SOLUTION INTRAVENOUS at 19:59

## 2023-01-24 RX ADMIN — ENOXAPARIN SODIUM 40 MG: 40 INJECTION SUBCUTANEOUS at 17:08

## 2023-01-24 NOTE — PROGRESS NOTES
[facility] Internal Medicine Daily Progress Note    Date of Service  1/24/2023    [facility] Team: [facility] IM White Team   Attending: Joni Nieves M.d.  Senior Resident: Dr. De Los Santos  Intern:  Dr. Noe   Contact Number: [phone]    Chief Complaint  Jason Yip is a 62 y.o. male admitted 1/20/2023 with abdominal pain.     Hospital Course  Jason Yip is a 62 y.o. male who presented 1/20/2023 abdominal pain.   He has a past medical history of cirrhosis (seen on imaging) COPD and polysubstance use (however patient states that he quit several weeks ago). Patient initially presented to [facility] in [facility] on 1/4/2023 reporting 2 months of difficulty with urination. On initial presentation he was found to be septic with a leukocytosis of over 14,000.  At the time CT abdomen pelvis revealed severe sigmoid diverticulitis with inflammatory phlegmon formation and possible colovesicular fistula. At that time he was treated with ertapenem, Flagyl, and also treated for alcohol withdrawal.  Surgery was consulted and recommended no surgical intervention at the time.   He later presented to [facility] again on 1/15/2023 for evaluation of lower abdominal pain and bubbles in his urine.  Urine cultures at the time revealed resistance to ciprofloxacin.  Repeat CT revealed sigmoid colovesicular fistula and patient was transferred to [facility] for higher level of care on 1/17/2023.  (Records not available from [facility]) Per patient while he was at [facility] he was told that he required IV antibiotics and surgical intervention on outpatient basis after antibiotic treatment for 2 weeks and was discharged on 1/19/2023. Patient states that he was discharged on p.o. Augmentin with a Taxi voucher that took him somewhere in [place] rather than back to [place] and he has been homeless since then. He presented to [facility] and was discharged back to the  shelter 1/19. He was brought by ambulance from [facility] on 1/20 for continued abd pain.     Interval Problem Update  This morning Mr Yip denied any abd pain, any pain with BM, urination, eating, movement, or palpation. His only other complaint was that he wanted to leave his room to go for a walk. Explained that his urine culture grew a bacteria that required isolation and he expressed understanding.  -Continue Meropenem.   -Repeat CT 1/27 per surgery.   -uosm and José Miguel.     I have discussed this patient's plan of care and discharge plan at IDT rounds today with Case Management, Nursing, Nursing leadership, and other members of the IDT team.    Consultants/Specialty  general surgery    Code Status  Full Code    Disposition  Patient is not medically cleared for discharge.   Anticipate discharge to to home with close outpatient follow-up.  I have placed the appropriate orders for post-discharge needs.    Review of Systems  Review of Systems   Constitutional:  Negative for chills, fever and malaise/fatigue.   HENT:  Negative for congestion and sore throat.    Respiratory:  Negative for cough, sputum production, shortness of breath and stridor.    Cardiovascular:  Negative for chest pain, palpitations and orthopnea.   Gastrointestinal:  Negative for abdominal pain, constipation, diarrhea, heartburn, melena, nausea and vomiting.   Genitourinary:  Negative for dysuria, flank pain, frequency, hematuria and urgency. Positive for pneumaturia.   Musculoskeletal:  Negative for myalgias, back pain and neck pain. Myalgias resolved.   Neurological:  Negative for dizziness, tremors, weakness and headaches.     Physical Exam  Temp:  [36.5 °C (97.7 °F)-36.9 °C (98.5 °F)] 36.8 °C (98.2 °F)  Pulse:  [80-87] 81  Resp:  [18] 18  BP: (114-138)/(71-92) 114/71  SpO2:  [94 %] 94 %    Physical Exam  Vitals and nursing note reviewed.   Constitutional:       General: He is not in acute distress.     Appearance: Normal appearance. He is  normal weight. He is not ill-appearing or toxic-appearing.    Cardiovascular:      Rate and Rhythm: Normal rate and regular rhythm.      Pulses: Normal pulses.      Heart sounds: Normal heart sounds. No murmur heard.    No friction rub. No gallop.   Pulmonary:      Effort: Pulmonary effort is normal. No respiratory distress.      Breath sounds: Normal breath sounds. No stridor. No wheezing, rhonchi or rales.   Abdominal:      General: Abdomen is flat. Bowel sounds are normal. There is no distension.      Palpations: Abdomen is soft.      Tenderness: There is no abdominal tenderness. There is no guarding or rebound.   Musculoskeletal:         General: No swelling or deformity.      Right lower leg: No edema.      Left lower leg: No edema.   Neurological:      Mental Status: He is alert and oriented to person, place, and time.     Fluids    Intake/Output Summary (Last 24 hours) at 1/24/2023 0718  Last data filed at 1/23/2023 1700  Gross per 24 hour   Intake 840 ml   Output --   Net 840 ml       Laboratory  Recent Labs     01/22/23  0731   WBC 9.2   RBC 5.10   HEMOGLOBIN 15.8   HEMATOCRIT 46.1   MCV 90.4   MCH 31.0   MCHC 34.3   RDW 40.5   PLATELETCT 344   MPV 8.9*     Recent Labs     01/22/23  0731 01/24/23  0147   SODIUM 132* 133*   POTASSIUM 4.3 4.5   CHLORIDE 97 97   CO2 26 26   GLUCOSE 138* 124*   BUN 16 13   CREATININE 0.74 0.66   CALCIUM 9.0 8.9                   Imaging  CT-ABDOMEN-PELVIS WITH    Result Date: 1/21/2023  1.  Changes of sigmoid diverticulitis, small collection of fluid with focus of air adjacent to sigmoid colon is seen compatible with microperforation and small diverticular abscess. 2.  Colovesicular fistula is seen with air in the bladder. 3.  Irregular hepatic contour favoring changes of cirrhosis 4.  Segment 2 duodenal diverticula 5.  Small fat-containing right inguinal hernia 6.  Left nephrolithiasis 7.  Cholelithiasis 8.  Atherosclerosis These findings were discussed with the patient's  clinician, Kenji Coronel, on 1/21/2023 12:22 AM.    CT-RENAL COLIC EVALUATION(A/P W/O)    Result Date: 1/4/2023  IMPRESSION: 1. Severe sigmoid diverticulosis with severe diverticulitis with inflammatory phlegmon along the right with inflammatory changes extending to the dome of the bladder and 2 adjacent loop of small bowel. No drainable abscess is present. 2. These findings were discussed with Dr. Gore shortly following completion of the study.    Electronically signed by: Carlos Taylor 1/4/2023 11:39 AM       [id number] VIEW    Result Date: 1/15/2023  IMPRESSION: 1. Mild adynamic ileus.       Electronically signed by: Carlos Taylor 1/15/2023 12:52 PM       DX-CHEST-PORTABLE (1 VIEW)    Result Date: 1/15/2023  IMPRESSION: 1. No evidence of acute process in the chest.    Electronically signed by: Carlos Taylor 1/15/2023 12:50 PM       CT ABDOMEN-PELVIS WITH IV CONTRAST    Result Date: 1/15/2023  IMPRESSION: 1. Severe sigmoid diverticulitis with probable multiple small abscesses in the surrounding tissues. No large drainable abscess is seen. The inflammatory changes extend to the bladder. Air in the bladder is consistent with a sigmoid colon bladder fistula. 2. Tiny gallstone without evidence of acute cholecystitis or bile duct dilatation. 3. Splenomegaly without focal abnormality.    Electronically signed by: Carlos Taylor 1/15/2023 4:25 PM       CT-CTA CHEST PULMONARY ARTERY W/ RECONS    Result Date: 1/16/2023  IMPRESSION: 1. No evidence of pulmonary emboli. 2. New subsegmental atelectasis right lower lobe.    Electronically signed by: Carlos Taylor 1/16/2023 12:15 PM           Assessment/Plan  Problem Representation:    * Colovesical fistula (?)   Assessment & Plan  -Seen on imaging on 1/17 and again on 1/21.  Per Dr. Leong who evaluated patient at [facility] 1/17, recommended 2 weeks of antibiotics prior to proceeding with any surgery.   -Prior cultures  revealed resistance to ciprofloxacin.   -Repeat urine culture ESBL E Coli.   Plan:   -General surgery Dr. Hu consulted and recommended IV abx and repeat imaging   -Continue Meropenem (started 1/23)       Complicated Diverticulitis- (present on admission)  Assessment & Plan  -Complicated by multiple phlegmon and possible colovesicular fistula (seen on CT 1/21)  -Tolerating regular diet   Plan:   -If abd pain returns will make NPO or return to liquid diet.   -Pain control with PRN tylenol   -IV meropenem   -repeat CT 1/27     Hyponatremia  Assessment & Plan  Ddx secondary to GI losses, SIADH, heart failure, cirrhosis   -Does not fit hypothyroidism or beer potomania   -128 on admission. Improving with PO intake.   -Pt has recurrent myalgias, could be sx of hyponatremia.  Plan:   -urine osm and urine na.     Bacteremia (?)  Assessment & Plan  -Per outside hospital records blood cultures positive for gram positive cocci  -Repeat blood cultures negative.   -Requested [facility] records      COPD (chronic obstructive pulmonary disease) (HCC)  Assessment & Plan  -No PFTs on file  -Chest x-ray clear on admission, no wheezing on exam  -Symbicort daily  -As needed albuterol     Alcoholic cirrhosis of liver without ascites (HCC)  Assessment & Plan  -Cirrhotic changes seen on imaging  -Patient is mildly distended on exam however no clear signs of ascites.  No hepatic encephalopathy at this time.     History of methamphetamine use  Assessment & Plan  -Patient denies any use in the past several weeks  -Encourage continued sobriety     Nephrolithiasis  Assessment & Plan  -Nonobstructing nephrolithiasis, incidental finding on imaging.  -Continue to monitor renal function and urine output     Cholelithiasis  Assessment & Plan  -Asymptomatic  -Outpatient follow up      VTE prophylaxis: SCDs/TEDs    I have performed a physical exam and reviewed and updated ROS and Plan today (1/24/2023). In review of yesterday's note (1/23/2023),  there are no changes except as documented above.

## 2023-01-24 NOTE — PROGRESS NOTES
Surgical Progress Note    Author: Edgard Charles M.D. Date & Time created: 2023   6:39 PM     Interval Events:  Admitted with colovesicular fistula and diverticulitis last night, treated at multiple hospitals recently. He is apparently being treated by Dr. Leong and planned for robotic 1 stage operation in the near future      ROS  Hemodynamics:  Temp (24hrs), Av.7 °C (98 °F), Min:36.5 °C (97.7 °F), Max:36.9 °C (98.5 °F)  Temperature: 36.9 °C (98.5 °F)  Pulse  Av.9  Min: 52  Max: 102   Blood Pressure: (!) 138/92 (Nurse notified )     Respiratory:    Respiration: 18, Pulse Oximetry: 94 %           Neuro:  GCS       Fluids:    Intake/Output Summary (Last 24 hours) at 2023 1839  Last data filed at 2023 1700  Gross per 24 hour   Intake 840 ml   Output --   Net 840 ml        Current Diet Order   Procedures    Diet Order Diet: Regular     Physical Exam  Appears well  Neck supple  Regular heart rate  Normal respiratory effort  Abdomen soft, TTP in RLQ/LLQ, upper abdomen nontender  Ext ROM grossly intact  Skin pink and warm          Labs:  No results found for this or any previous visit (from the past 24 hour(s)).  Medical Decision Making, by Problem:  Active Hospital Problems    Diagnosis     Colovesical fistula [N32.1]      Priority: High     Associated with acute complicated diverticulitis.     Recommend IV antibiotics and re-scan in about 5 days to evaluate for improvement. Symptoms have reportedly improved clinically over the past week since he's been in [place].     Discussed with patient that our only other option at this point is to do an open surgery and colostomy placement. After discussion we are in agreement that he would like a prolonged antibiotic course to try to avoid that.     Ok from my standpoint for a regular diet.       Diverticulitis [K57.92]     COPD (chronic obstructive pulmonary disease) (HCC) [J44.9]     Alcoholic cirrhosis of liver without ascites (HCC) [K70.30]      Hyponatremia [E87.1]     Cholelithiasis [K80.20]     Nephrolithiasis [N20.0]     History of methamphetamine use [F15.91]     Bacteremia [R78.81]      Plan:  61 y/o male with diverticulitis, colovesicular fistula, planned for sigmoid resection by Dr. Leong in near future  Appreciate medical care  Dvt prophylaxis  Broad spectrum IV abx  CLD  Plan for repeat CT on 1/27  Will follow    Quality Measures:  Quality-Core Measures    Discussed patient condition with Hospitalist and Patient

## 2023-01-24 NOTE — PROGRESS NOTES
Surgical Progress Note    Author: Edgard Charles M.D. Date & Time created: 2023   6:39 PM     Interval Events:  Admitted with colovesicular fistula and diverticulitis last night, treated at multiple hospitals recently. He is being treated by Dr. Leong and planned for robotic 1 stage operation in the near future. Spoke to Dr. Leong this am who agreed to see patient/review CT this upcoming ,Monday and update us on his plan for surgery. Large normal(nayana) BM this am, urination greatly improved      ROS  Hemodynamics:  Temp (24hrs), Av.6 °C (97.9 °F), Min:36.2 °C (97.2 °F), Max:36.9 °C (98.5 °F)  Temperature: 36.2 °C (97.2 °F)  Pulse  Av.4  Min: 52  Max: 102   Blood Pressure: (!) 125/91 (Nurse notified )     Respiratory:    Respiration: 18, Pulse Oximetry: 96 %           Neuro:  GCS       Fluids:    Intake/Output Summary (Last 24 hours) at 2023 1839  Last data filed at 2023 1700  Gross per 24 hour   Intake 840 ml   Output --   Net 840 ml        Current Diet Order   Procedures    Diet Order Diet: Regular     Physical Exam  Appears well  Neck supple  Regular heart rate  Normal respiratory effort  Abdomen soft, TTP in RLQ/LLQ, upper abdomen nontender  Ext ROM grossly intact  Skin pink and warm          Labs:  Recent Results (from the past 24 hour(s))   Basic Metabolic Panel    Collection Time: 23  1:47 AM   Result Value Ref Range    Sodium 133 (L) 135 - 145 mmol/L    Potassium 4.5 3.6 - 5.5 mmol/L    Chloride 97 96 - 112 mmol/L    Co2 26 20 - 33 mmol/L    Glucose 124 (H) 65 - 99 mg/dL    Bun 13 8 - 22 mg/dL    Creatinine 0.66 0.50 - 1.40 mg/dL    Calcium 8.9 8.5 - 10.5 mg/dL    Anion Gap 10.0 7.0 - 16.0   ESTIMATED GFR    Collection Time: 23  1:47 AM   Result Value Ref Range    GFR (CKD-EPI) 106 >60 mL/min/1.73 m 2   CBC WITH DIFFERENTIAL    Collection Time: 23  1:47 AM   Result Value Ref Range    WBC 12.7 (H) 4.8 - 10.8 K/uL    RBC 5.00 4.70 - 6.10 M/uL    Hemoglobin 15.7  14.0 - 18.0 g/dL    Hematocrit 46.0 42.0 - 52.0 %    MCV 92.0 81.4 - 97.8 fL    MCH 31.4 27.0 - 33.0 pg    MCHC 34.1 33.7 - 35.3 g/dL    RDW 42.2 35.9 - 50.0 fL    Platelet Count 375 164 - 446 K/uL    MPV 9.0 9.0 - 12.9 fL    Neutrophils-Polys 57.40 44.00 - 72.00 %    Lymphocytes 28.20 22.00 - 41.00 %    Monocytes 9.00 0.00 - 13.40 %    Eosinophils 3.10 0.00 - 6.90 %    Basophils 1.40 0.00 - 1.80 %    Immature Granulocytes 0.90 0.00 - 0.90 %    Nucleated RBC 0.00 /100 WBC    Neutrophils (Absolute) 7.29 1.82 - 7.42 K/uL    Lymphs (Absolute) 3.58 1.00 - 4.80 K/uL    Monos (Absolute) 1.15 (H) 0.00 - 0.85 K/uL    Eos (Absolute) 0.39 0.00 - 0.51 K/uL    Baso (Absolute) 0.18 (H) 0.00 - 0.12 K/uL    Immature Granulocytes (abs) 0.12 (H) 0.00 - 0.11 K/uL    NRBC (Absolute) 0.00 K/uL     Medical Decision Making, by Problem:  Active Hospital Problems    Diagnosis     Colovesical fistula [N32.1]      Priority: High     Associated with acute complicated diverticulitis.     Recommend IV antibiotics and re-scan in about 5 days to evaluate for improvement. Symptoms have reportedly improved clinically over the past week since he's been in [place].     Discussed with patient that our only other option at this point is to do an open surgery and colostomy placement. After discussion we are in agreement that he would like a prolonged antibiotic course to try to avoid that.     Ok from my standpoint for a regular diet.       Diverticulitis [K57.92]     COPD (chronic obstructive pulmonary disease) (HCC) [J44.9]     Alcoholic cirrhosis of liver without ascites (HCC) [K70.30]     Hyponatremia [E87.1]     Cholelithiasis [K80.20]     Nephrolithiasis [N20.0]     History of methamphetamine use [F15.91]     Bacteremia [R78.81]      Plan:  63 y/o male with diverticulitis, colovesicular fistula, planned for sigmoid resection by Dr. Leong in near future  Appreciate medical care  Dvt prophylaxis  Broad spectrum IV abx  CLD  Plan for repeat CT on  1/27  Will follow    Quality Measures:  Quality-Core Measures    Discussed patient condition with Hospitalist and Patient

## 2023-01-25 LAB
BASOPHILS # BLD AUTO: 0.9 % (ref 0–1.8)
BASOPHILS # BLD: 0.11 K/UL (ref 0–0.12)
EOSINOPHIL # BLD AUTO: 1.04 K/UL (ref 0–0.51)
EOSINOPHIL NFR BLD: 8.6 % (ref 0–6.9)
ERYTHROCYTE [DISTWIDTH] IN BLOOD BY AUTOMATED COUNT: 42.1 FL (ref 35.9–50)
HCT VFR BLD AUTO: 46.8 % (ref 42–52)
HGB BLD-MCNC: 15.9 G/DL (ref 14–18)
LYMPHOCYTES # BLD AUTO: 2.82 K/UL (ref 1–4.8)
LYMPHOCYTES NFR BLD: 23.3 % (ref 22–41)
MANUAL DIFF BLD: NORMAL
MCH RBC QN AUTO: 31.1 PG (ref 27–33)
MCHC RBC AUTO-ENTMCNC: 34 G/DL (ref 33.7–35.3)
MCV RBC AUTO: 91.4 FL (ref 81.4–97.8)
MONOCYTES # BLD AUTO: 1.04 K/UL (ref 0–0.85)
MONOCYTES NFR BLD AUTO: 8.6 % (ref 0–13.4)
MORPHOLOGY BLD-IMP: NORMAL
MYELOCYTES NFR BLD MANUAL: 1.7 %
NEUTROPHILS # BLD AUTO: 6.88 K/UL (ref 1.82–7.42)
NEUTROPHILS NFR BLD: 56.9 % (ref 44–72)
NRBC # BLD AUTO: 0 K/UL
NRBC BLD-RTO: 0 /100 WBC
OSMOLALITY SERPL: 284 MOSM/KG H2O (ref 278–298)
PLATELET # BLD AUTO: 390 K/UL (ref 164–446)
PLATELET BLD QL SMEAR: NORMAL
PMV BLD AUTO: 8.6 FL (ref 9–12.9)
RBC # BLD AUTO: 5.12 M/UL (ref 4.7–6.1)
RBC BLD AUTO: PRESENT
TOXIC GRANULES BLD QL SMEAR: SLIGHT
WBC # BLD AUTO: 12.1 K/UL (ref 4.8–10.8)

## 2023-01-25 PROCEDURE — A9270 NON-COVERED ITEM OR SERVICE: HCPCS

## 2023-01-25 PROCEDURE — 770001 HCHG ROOM/CARE - MED/SURG/GYN PRIV*

## 2023-01-25 PROCEDURE — 36415 COLL VENOUS BLD VENIPUNCTURE: CPT

## 2023-01-25 PROCEDURE — 85025 COMPLETE CBC W/AUTO DIFF WBC: CPT

## 2023-01-25 PROCEDURE — 85007 BL SMEAR W/DIFF WBC COUNT: CPT

## 2023-01-25 PROCEDURE — 99233 SBSQ HOSP IP/OBS HIGH 50: CPT | Mod: GC | Performed by: HOSPITALIST

## 2023-01-25 PROCEDURE — 99232 SBSQ HOSP IP/OBS MODERATE 35: CPT | Performed by: SURGERY

## 2023-01-25 PROCEDURE — 700102 HCHG RX REV CODE 250 W/ 637 OVERRIDE(OP)

## 2023-01-25 PROCEDURE — 83930 ASSAY OF BLOOD OSMOLALITY: CPT

## 2023-01-25 PROCEDURE — 700111 HCHG RX REV CODE 636 W/ 250 OVERRIDE (IP)

## 2023-01-25 PROCEDURE — 700105 HCHG RX REV CODE 258

## 2023-01-25 RX ADMIN — MEROPENEM 500 MG: 500 INJECTION, POWDER, FOR SOLUTION INTRAVENOUS at 15:16

## 2023-01-25 RX ADMIN — MEROPENEM 500 MG: 500 INJECTION, POWDER, FOR SOLUTION INTRAVENOUS at 01:00

## 2023-01-25 RX ADMIN — Medication 5 MG: at 20:36

## 2023-01-25 RX ADMIN — BUDESONIDE AND FORMOTEROL FUMARATE DIHYDRATE 2 PUFF: 160; 4.5 AEROSOL RESPIRATORY (INHALATION) at 17:12

## 2023-01-25 RX ADMIN — BUDESONIDE AND FORMOTEROL FUMARATE DIHYDRATE 2 PUFF: 160; 4.5 AEROSOL RESPIRATORY (INHALATION) at 08:00

## 2023-01-25 RX ADMIN — MEROPENEM 500 MG: 500 INJECTION, POWDER, FOR SOLUTION INTRAVENOUS at 08:57

## 2023-01-25 RX ADMIN — ENOXAPARIN SODIUM 40 MG: 40 INJECTION SUBCUTANEOUS at 17:12

## 2023-01-25 RX ADMIN — MEROPENEM 500 MG: 500 INJECTION, POWDER, FOR SOLUTION INTRAVENOUS at 20:34

## 2023-01-25 ASSESSMENT — PAIN DESCRIPTION - PAIN TYPE: TYPE: ACUTE PAIN

## 2023-01-25 NOTE — PROGRESS NOTES
[facility] Internal Medicine Daily Progress Note    Date of Service  1/25/2023    [facility] Team: [facility] IM White Team   Attending: Joni Nieves M.d.  Senior Resident: Dr. De Los Santos  Intern:  Dr. Noe   Contact Number: [phone]    Chief Complaint  Jason Yip is a 62 y.o. male admitted 1/20/2023 with abdominal pain.     Hospital Course  Jason Yip is a 62 y.o. male who presented 1/20/2023 abdominal pain.   He has a past medical history of cirrhosis (seen on imaging) COPD and polysubstance use (however patient states that he quit several weeks ago). Patient initially presented to [facility] in [facility] on 1/4/2023 reporting 2 months of difficulty with urination. On initial presentation he was found to be septic with a leukocytosis of over 14,000.  At the time CT abdomen pelvis revealed severe sigmoid diverticulitis with inflammatory phlegmon formation and possible colovesicular fistula. At that time he was treated with ertapenem, Flagyl, and also treated for alcohol withdrawal.  Surgery was consulted and recommended no surgical intervention at the time.   He later presented to [facility] again on 1/15/2023 for evaluation of lower abdominal pain and bubbles in his urine.  Urine cultures at the time revealed resistance to ciprofloxacin.  Repeat CT revealed sigmoid colovesicular fistula and patient was transferred to [facility] for higher level of care on 1/17/2023.  (Records not available from [facility]) Per patient while he was at [facility] he was told that he required IV antibiotics and surgical intervention on outpatient basis after antibiotic treatment for 2 weeks and was discharged on 1/19/2023. Patient states that he was discharged on p.o. Augmentin with a Taxi voucher that took him somewhere in [place] rather than back to [place] and he has been homeless since then. He presented to [facility] and was discharged back to the  shelter 1/19. He was brought by ambulance from [facility] on 1/20 for continued abd pain.     While admitted here general surgery was consulted and recommended liquid diet, broad spectrum abx and plan for possible sigmoid resection by Dr. Leong. Urine culture from admission was positive for ESBL e coli on 1/23 so patient was started on Meropenem.     Interval Problem Update  Pt still having regular Bms and no pain/difficulty with urination. He said he is walking around his room okay but wants to walk around the duenas still even though explained yesterday that he needs to stay isolated. Other than wanting to walk outside he had no concerns/complaints.   -clear liquid diet per surgery recs.   -Repeat CT planned for 1/27.     I have discussed this patient's plan of care and discharge plan at IDT rounds today with Case Management, Nursing, Nursing leadership, and other members of the IDT team.    Consultants/Specialty  general surgery    Code Status  Full Code    Disposition  Patient is not medically cleared for discharge.   Anticipate discharge to to home with close outpatient follow-up.  I have placed the appropriate orders for post-discharge needs.    Review of Systems  Review of Systems   Constitutional:  Negative for chills, fever and malaise/fatigue.   HENT:  Negative for congestion and sore throat.    Respiratory:  Negative for cough, sputum production, shortness of breath.   Cardiovascular:  Negative for chest pain, palpitations and orthopnea.   Gastrointestinal:  Negative for abdominal pain, constipation, diarrhea, heartburn, melena, nausea and vomiting.   Genitourinary:  Negative for dysuria, flank pain, frequency, hematuria and urgency. Positive for pneumaturia.   Musculoskeletal:  Negative for myalgias, back pain and neck pain.   Neurological:  Negative for dizziness, tremors, weakness and headaches.     Physical Exam  Temp:  [36.4 °C (97.5 °F)-37.1 °C (98.8 °F)] 37 °C (98.6 °F)  Pulse:  [] 98  Resp:   [16-20] 16  BP: (116-128)/(73-97) 127/91  SpO2:  [93 %-98 %] 93 %    Physical Exam  Vitals and nursing note reviewed.   Constitutional:       General: He is not in acute distress.     Appearance: Normal appearance. He is normal weight. He is not ill-appearing or toxic-appearing.    Cardiovascular:      Rate and Rhythm: Normal rate and regular rhythm.      Pulses: Normal pulses.      Heart sounds: Normal heart sounds. No murmur heard.    No friction rub. No gallop.   Pulmonary:      Effort: Pulmonary effort is normal. No respiratory distress.      Breath sounds: Normal breath sounds. No stridor. No wheezing, rhonchi or rales.   Abdominal:      General: Abdomen is flat. Bowel sounds are normal. There is no distension.      Palpations: Abdomen is soft.      Tenderness: There is no abdominal tenderness. There is no guarding or rebound.   Neurological:      Mental Status: He is alert and oriented to person, place, and time.      Comment: Observed ambulating, no difficulty w/ ambulation.     Fluids    Intake/Output Summary (Last 24 hours) at 1/25/2023 1409  Last data filed at 1/25/2023 1300  Gross per 24 hour   Intake 760 ml   Output --   Net 760 ml       Laboratory  Recent Labs     01/24/23  0147 01/25/23  0619   WBC 12.7* 12.1*   RBC 5.00 5.12   HEMOGLOBIN 15.7 15.9   HEMATOCRIT 46.0 46.8   MCV 92.0 91.4   MCH 31.4 31.1   MCHC 34.1 34.0   RDW 42.2 42.1   PLATELETCT 375 390   MPV 9.0 8.6*     Recent Labs     01/24/23  0147   SODIUM 133*   POTASSIUM 4.5   CHLORIDE 97   CO2 26   GLUCOSE 124*   BUN 13   CREATININE 0.66   CALCIUM 8.9                   Imaging  CT-ABDOMEN-PELVIS WITH    Result Date: 1/21/2023  1.  Changes of sigmoid diverticulitis, small collection of fluid with focus of air adjacent to sigmoid colon is seen compatible with microperforation and small diverticular abscess. 2.  Colovesicular fistula is seen with air in the bladder. 3.  Irregular hepatic contour favoring changes of cirrhosis 4.  Segment 2  duodenal diverticula 5.  Small fat-containing right inguinal hernia 6.  Left nephrolithiasis 7.  Cholelithiasis 8.  Atherosclerosis These findings were discussed with the patient's clinician, Kenji Coronel, on 1/21/2023 12:22 AM.    CT-RENAL COLIC EVALUATION(A/P W/O)    Result Date: 1/4/2023  IMPRESSION: 1. Severe sigmoid diverticulosis with severe diverticulitis with inflammatory phlegmon along the right with inflammatory changes extending to the dome of the bladder and 2 adjacent loop of small bowel. No drainable abscess is present. 2. These findings were discussed with Dr. Gore shortly following completion of the study.    Electronically signed by: Carlos Taylor 1/4/2023 11:39 AM       [id number] VIEW    Result Date: 1/15/2023  IMPRESSION: 1. Mild adynamic ileus.       Electronically signed by: Carlos Taylor 1/15/2023 12:52 PM       DX-CHEST-PORTABLE (1 VIEW)    Result Date: 1/15/2023  IMPRESSION: 1. No evidence of acute process in the chest.    Electronically signed by: Carlos Taylor 1/15/2023 12:50 PM       CT ABDOMEN-PELVIS WITH IV CONTRAST    Result Date: 1/15/2023  IMPRESSION: 1. Severe sigmoid diverticulitis with probable multiple small abscesses in the surrounding tissues. No large drainable abscess is seen. The inflammatory changes extend to the bladder. Air in the bladder is consistent with a sigmoid colon bladder fistula. 2. Tiny gallstone without evidence of acute cholecystitis or bile duct dilatation. 3. Splenomegaly without focal abnormality.    Electronically signed by: Carlos Taylor 1/15/2023 4:25 PM       CT-CTA CHEST PULMONARY ARTERY W/ RECONS    Result Date: 1/16/2023  IMPRESSION: 1. No evidence of pulmonary emboli. 2. New subsegmental atelectasis right lower lobe.    Electronically signed by: Carlos Taylor 1/16/2023 12:15 PM           Assessment/Plan  Problem Representation:    * Colovesical fistula (?)   Assessment & Plan  -Seen on imaging on 1/17 and again on  1/21.  Per Dr. Leong who evaluated patient at [facility] 1/17, recommended 2 weeks of antibiotics prior to proceeding with any surgery.   -Prior cultures revealed resistance to ciprofloxacin.   -Repeat urine culture ESBL E Coli.   Plan:   -General surgery Dr. Hu consulted and recommended IV abx and repeat imaging   -Continue Meropenem (started 1/23)       Complicated Diverticulitis- (present on admission)  Assessment & Plan  -Complicated by multiple phlegmon and possible colovesicular fistula (seen on CT 1/21)  Plan:   -Liquid diet per surgery recommendations. If abd pain returns will make NPO  -Pain control with PRN tylenol   -IV meropenem   -repeat CT 1/27     Hyponatremia  Assessment & Plan  -Serum Osm 284 (WNL) indicating isotonic hyponatremia. Ddx hyperproteinuria or hyperlipidemia.   -128 on admission. Improving to 130s with PO intake.   -Pt has recurrent myalgias, could be sx of hyponatremia.  -Uosm and Amanda elevated.   -Lipid panel 6/6/22 WNL  -UA 1/20 notable for proteinuria.   Plan:   -can consider ordering urine protein creatinine ratio      Bacteremia (?)  Assessment & Plan  -Per outside hospital records blood cultures positive for gram positive cocci  -Repeat blood cultures negative.   -Requested [facility] records      COPD (chronic obstructive pulmonary disease) (HCC)  Assessment & Plan  -No PFTs on file  -Chest x-ray clear on admission, no wheezing on exam  -Symbicort daily  -As needed albuterol     Alcoholic cirrhosis of liver without ascites (HCC)  Assessment & Plan  -Cirrhotic changes seen on imaging  -Patient is mildly distended on exam however no clear signs of ascites.  No hepatic encephalopathy at this time.      History of methamphetamine use  Assessment & Plan  -Patient denies any use in the past several weeks  -Encourage continued sobriety     Nephrolithiasis  Assessment & Plan  -Nonobstructing nephrolithiasis, incidental finding on imaging.  -Continue to monitor renal  function and urine output     Cholelithiasis  Assessment & Plan  -Asymptomatic  -Outpatient follow up      VTE prophylaxis: SCDs/TEDs    I have performed a physical exam and reviewed and updated ROS and Plan today (1/25/2023). In review of yesterday's note (1/24/2023), there are no changes except as documented above.

## 2023-01-26 LAB
BACTERIA BLD CULT: NORMAL
BACTERIA BLD CULT: NORMAL
BASOPHILS # BLD AUTO: 1.3 % (ref 0–1.8)
BASOPHILS # BLD: 0.16 K/UL (ref 0–0.12)
EOSINOPHIL # BLD AUTO: 0.24 K/UL (ref 0–0.51)
EOSINOPHIL NFR BLD: 1.9 % (ref 0–6.9)
ERYTHROCYTE [DISTWIDTH] IN BLOOD BY AUTOMATED COUNT: 43.1 FL (ref 35.9–50)
HCT VFR BLD AUTO: 48.5 % (ref 42–52)
HGB BLD-MCNC: 16.3 G/DL (ref 14–18)
IMM GRANULOCYTES # BLD AUTO: 0.13 K/UL (ref 0–0.11)
IMM GRANULOCYTES NFR BLD AUTO: 1.1 % (ref 0–0.9)
LYMPHOCYTES # BLD AUTO: 3.82 K/UL (ref 1–4.8)
LYMPHOCYTES NFR BLD: 30.9 % (ref 22–41)
MCH RBC QN AUTO: 31.2 PG (ref 27–33)
MCHC RBC AUTO-ENTMCNC: 33.6 G/DL (ref 33.7–35.3)
MCV RBC AUTO: 92.9 FL (ref 81.4–97.8)
MONOCYTES # BLD AUTO: 1.01 K/UL (ref 0–0.85)
MONOCYTES NFR BLD AUTO: 8.2 % (ref 0–13.4)
NEUTROPHILS # BLD AUTO: 7.01 K/UL (ref 1.82–7.42)
NEUTROPHILS NFR BLD: 56.6 % (ref 44–72)
NRBC # BLD AUTO: 0 K/UL
NRBC BLD-RTO: 0 /100 WBC
PLATELET # BLD AUTO: 386 K/UL (ref 164–446)
PMV BLD AUTO: 8.8 FL (ref 9–12.9)
RBC # BLD AUTO: 5.22 M/UL (ref 4.7–6.1)
SIGNIFICANT IND 70042: NORMAL
SIGNIFICANT IND 70042: NORMAL
SITE SITE: NORMAL
SITE SITE: NORMAL
SOURCE SOURCE: NORMAL
SOURCE SOURCE: NORMAL
WBC # BLD AUTO: 12.4 K/UL (ref 4.8–10.8)

## 2023-01-26 PROCEDURE — 700105 HCHG RX REV CODE 258

## 2023-01-26 PROCEDURE — 85025 COMPLETE CBC W/AUTO DIFF WBC: CPT

## 2023-01-26 PROCEDURE — A9270 NON-COVERED ITEM OR SERVICE: HCPCS

## 2023-01-26 PROCEDURE — 99233 SBSQ HOSP IP/OBS HIGH 50: CPT | Mod: GC | Performed by: HOSPITALIST

## 2023-01-26 PROCEDURE — 700102 HCHG RX REV CODE 250 W/ 637 OVERRIDE(OP)

## 2023-01-26 PROCEDURE — 99232 SBSQ HOSP IP/OBS MODERATE 35: CPT | Performed by: SURGERY

## 2023-01-26 PROCEDURE — 700111 HCHG RX REV CODE 636 W/ 250 OVERRIDE (IP)

## 2023-01-26 PROCEDURE — 36415 COLL VENOUS BLD VENIPUNCTURE: CPT

## 2023-01-26 PROCEDURE — 770001 HCHG ROOM/CARE - MED/SURG/GYN PRIV*

## 2023-01-26 RX ADMIN — MEROPENEM 500 MG: 500 INJECTION, POWDER, FOR SOLUTION INTRAVENOUS at 02:38

## 2023-01-26 RX ADMIN — BUDESONIDE AND FORMOTEROL FUMARATE DIHYDRATE 2 PUFF: 160; 4.5 AEROSOL RESPIRATORY (INHALATION) at 17:35

## 2023-01-26 RX ADMIN — MEROPENEM 500 MG: 500 INJECTION, POWDER, FOR SOLUTION INTRAVENOUS at 08:36

## 2023-01-26 RX ADMIN — BUDESONIDE AND FORMOTEROL FUMARATE DIHYDRATE 2 PUFF: 160; 4.5 AEROSOL RESPIRATORY (INHALATION) at 06:21

## 2023-01-26 RX ADMIN — MEROPENEM 500 MG: 500 INJECTION, POWDER, FOR SOLUTION INTRAVENOUS at 14:55

## 2023-01-26 RX ADMIN — MEROPENEM 500 MG: 500 INJECTION, POWDER, FOR SOLUTION INTRAVENOUS at 20:08

## 2023-01-26 RX ADMIN — Medication 5 MG: at 20:01

## 2023-01-26 RX ADMIN — ENOXAPARIN SODIUM 40 MG: 40 INJECTION SUBCUTANEOUS at 17:34

## 2023-01-26 NOTE — CARE PLAN
"The patient is Stable - Low risk of patient condition declining or worsening    Shift Goals  Clinical Goals: maintain safety  Patient Goals: \"snacks\"  Family Goals: STEW    Progress made toward(s) clinical / shift goals:    Problem: Pain - Standard  Goal: Alleviation of pain or a reduction in pain to the patient’s comfort goal  Outcome: Progressing     Problem: Knowledge Deficit - Standard  Goal: Patient and family/care givers will demonstrate understanding of plan of care, disease process/condition, diagnostic tests and medications  Outcome: Progressing     Problem: Psychosocial Needs:  Goal: Ability to cope will improve  Outcome: Progressing     Problem: Urinary Elimination:  Goal: Ability to achieve and maintain adequate renal perfusion and functioning will improve  Outcome: Progressing  Goal: Ability to achieve a balanced intake and output will improve  Outcome: Progressing     Problem: Physical Regulation:  Goal: Diagnostic test results will improve  Outcome: Progressing     Problem: Knowledge Deficit:  Goal: Patient's knowledge of the prescribed therapeutic regimen will improve  Outcome: Progressing     Problem: Psychosocial  Goal: Patient's level of anxiety will decrease  Outcome: Progressing  Goal: Patient's ability to verbalize feelings about condition will improve  Outcome: Progressing  Goal: Patient's ability to re-evaluate and adapt role responsibilities will improve  Outcome: Progressing  Goal: Patient and family will demonstrate ability to cope with life altering diagnosis and/or procedure  Outcome: Progressing  Goal: Spiritual and cultural needs incorporated into hospitalization  Outcome: Progressing     Problem: Communication  Goal: The ability to communicate needs accurately and effectively will improve  Outcome: Progressing     Problem: Discharge Barriers/Planning  Goal: Patient's continuum of care needs are met  Outcome: Progressing     Problem: Respiratory  Goal: Patient will achieve/maintain " optimum respiratory ventilation and gas exchange  Outcome: Progressing     Problem: Fluid Volume  Goal: Fluid volume balance will be maintained  Outcome: Progressing     Problem: Dysphagia  Goal: Dysphagia will improve  Outcome: Progressing     Problem: Risk for Aspiration  Goal: Patient's risk for aspiration will be absent or decrease  Outcome: Progressing     Problem: Nutrition  Goal: Patient's nutritional and fluid intake will be adequate or improve  Outcome: Progressing  Goal: Enteral nutrition will be maintained or improve  Outcome: Progressing  Goal: Enteral nutrition will be maintained or improve  Outcome: Progressing     Problem: Urinary Elimination  Goal: Establish and maintain regular urinary output  Outcome: Progressing     Problem: Bowel Elimination  Goal: Establish and maintain regular bowel function  Outcome: Progressing     Problem: Gastrointestinal Irritability  Goal: Nausea and vomiting will be absent or improve  Outcome: Progressing  Goal: Diarrhea will be absent or improved  Outcome: Progressing     Problem: Mobility  Goal: Patient's capacity to carry out activities will improve  Outcome: Progressing     Problem: Self Care  Goal: Patient will have the ability to perform ADLs independently or with assistance (bathe, groom, dress, toilet and feed)  Outcome: Progressing     Problem: Infection - Standard  Goal: Patient will remain free from infection  Outcome: Progressing     Problem: Fall Risk  Goal: Patient will remain free from falls  Outcome: Progressing       Patient is not progressing towards the following goals:

## 2023-01-26 NOTE — PROGRESS NOTES
[facility] Internal Medicine Daily Progress Note    Date of Service  1/26/2023    [facility] Team: [facility] IM White Team   Attending: Joni Nieves M.d.  Senior Resident: Dr. De Los Santos  Intern:  Dr. Noe   Contact Number: [phone]    Chief Complaint  Jason Yip is a 62 y.o. male admitted 1/20/2023 with abdominal pain.     Hospital Course  Jason Yip is a 62 y.o. male who presented 1/20/2023 abdominal pain.   He has a past medical history of cirrhosis (seen on imaging) COPD and polysubstance use (however patient states that he quit several weeks ago). Patient initially presented to [facility] in [facility] on 1/4/2023 reporting 2 months of difficulty with urination. On initial presentation he was found to be septic with a leukocytosis of over 14,000.  At the time CT abdomen pelvis revealed severe sigmoid diverticulitis with inflammatory phlegmon formation and possible colovesicular fistula. At that time he was treated with ertapenem, Flagyl, and also treated for alcohol withdrawal.  Surgery was consulted and recommended no surgical intervention at the time.   He later presented to [facility] again on 1/15/2023 for evaluation of lower abdominal pain and bubbles in his urine.  Urine cultures at the time revealed resistance to ciprofloxacin.  Repeat CT revealed sigmoid colovesicular fistula and patient was transferred to [facility] for higher level of care on 1/17/2023.  (Records not available from [facility]) Per patient while he was at [facility] he was told that he required IV antibiotics and surgical intervention on outpatient basis after antibiotic treatment for 2 weeks and was discharged on 1/19/2023. Patient states that he was discharged on p.o. Augmentin with a Taxi voucher that took him somewhere in [place] rather than back to [place] and he has been homeless since then. He presented to [facility] and was discharged back to the  shelter 1/19. He was brought by ambulance from [facility] on 1/20 for continued abd pain.     While admitted here general surgery was consulted and recommended liquid diet, broad spectrum abx and plan for possible sigmoid resection by Dr. Leong. Urine culture from admission was positive for ESBL e coli on 1/23 so patient was started on Meropenem.     Interval Problem Update  -No acute overnight events  -VSS  - no new labs/imaging  - no acute complaints  - plan for CT imaging.  Surgery following.    I have discussed this patient's plan of care and discharge plan at IDT rounds today with Case Management, Nursing, Nursing leadership, and other members of the IDT team.    Consultants/Specialty  general surgery    Code Status  Full Code    Disposition  Patient is not medically cleared for discharge.   Anticipate discharge to to home with close outpatient follow-up.  I have placed the appropriate orders for post-discharge needs.    Review of Systems  Review of Systems   Constitutional:  Negative for chills, fever and malaise/fatigue.   HENT:  Negative for congestion and sore throat.    Respiratory:  Negative for cough, sputum production, shortness of breath.   Cardiovascular:  Negative for chest pain, palpitations and orthopnea.   Gastrointestinal:  Negative for abdominal pain, constipation, diarrhea, heartburn, melena, nausea and vomiting.   Genitourinary:  Negative for dysuria, flank pain, frequency, hematuria and urgency. Positive for pneumaturia.   Musculoskeletal:  Negative for myalgias, back pain and neck pain.   Neurological:  Negative for dizziness, tremors, weakness and headaches.     Physical Exam  Temp:  [36.4 °C (97.6 °F)-37 °C (98.6 °F)] 36.6 °C (97.8 °F)  Pulse:  [74-98] 80  Resp:  [16-18] 18  BP: (118-139)/(71-91) 139/81  SpO2:  [93 %-98 %] 96 %    Physical Exam  Vitals and nursing note reviewed.   Constitutional:       General: He is not in acute distress.     Appearance: Normal appearance. He is normal  weight. He is not ill-appearing or toxic-appearing.    Cardiovascular:      Rate and Rhythm: Normal rate and regular rhythm.      Pulses: Normal pulses.      Heart sounds: Normal heart sounds. No murmur heard.    No friction rub. No gallop.   Pulmonary:      Effort: Pulmonary effort is normal. No respiratory distress.      Breath sounds: Normal breath sounds. No stridor. No wheezing, rhonchi or rales.   Abdominal:      General: Abdomen is flat. Bowel sounds are normal. There is no distension.      Palpations: Abdomen is soft.      Tenderness: There is no abdominal tenderness. There is no guarding or rebound.   Neurological:      Mental Status: He is alert and oriented to person, place, and time.      Comment: Observed ambulating, no difficulty w/ ambulation.     Fluids    Intake/Output Summary (Last 24 hours) at 1/26/2023 0705  Last data filed at 1/25/2023 1601  Gross per 24 hour   Intake 1160 ml   Output --   Net 1160 ml         Laboratory  Recent Labs     01/24/23  0147 01/25/23  0619 01/26/23  0142   WBC 12.7* 12.1* 12.4*   RBC 5.00 5.12 5.22   HEMOGLOBIN 15.7 15.9 16.3   HEMATOCRIT 46.0 46.8 48.5   MCV 92.0 91.4 92.9   MCH 31.4 31.1 31.2   MCHC 34.1 34.0 33.6*   RDW 42.2 42.1 43.1   PLATELETCT 375 390 386   MPV 9.0 8.6* 8.8*       Recent Labs     01/24/23  0147   SODIUM 133*   POTASSIUM 4.5   CHLORIDE 97   CO2 26   GLUCOSE 124*   BUN 13   CREATININE 0.66   CALCIUM 8.9                     Imaging  CT-ABDOMEN-PELVIS WITH    Result Date: 1/21/2023  1.  Changes of sigmoid diverticulitis, small collection of fluid with focus of air adjacent to sigmoid colon is seen compatible with microperforation and small diverticular abscess. 2.  Colovesicular fistula is seen with air in the bladder. 3.  Irregular hepatic contour favoring changes of cirrhosis 4.  Segment 2 duodenal diverticula 5.  Small fat-containing right inguinal hernia 6.  Left nephrolithiasis 7.  Cholelithiasis 8.  Atherosclerosis These findings were  discussed with the patient's clinician, Kenji Coronel, on 1/21/2023 12:22 AM.    CT-RENAL COLIC EVALUATION(A/P W/O)    Result Date: 1/4/2023  IMPRESSION: 1. Severe sigmoid diverticulosis with severe diverticulitis with inflammatory phlegmon along the right with inflammatory changes extending to the dome of the bladder and 2 adjacent loop of small bowel. No drainable abscess is present. 2. These findings were discussed with Dr. Gore shortly following completion of the study.    Electronically signed by: Carlos Taylor 1/4/2023 11:39 AM       [id number] VIEW    Result Date: 1/15/2023  IMPRESSION: 1. Mild adynamic ileus.       Electronically signed by: Carlos Taylor 1/15/2023 12:52 PM       DX-CHEST-PORTABLE (1 VIEW)    Result Date: 1/15/2023  IMPRESSION: 1. No evidence of acute process in the chest.    Electronically signed by: Carlos Taylor 1/15/2023 12:50 PM       CT ABDOMEN-PELVIS WITH IV CONTRAST    Result Date: 1/15/2023  IMPRESSION: 1. Severe sigmoid diverticulitis with probable multiple small abscesses in the surrounding tissues. No large drainable abscess is seen. The inflammatory changes extend to the bladder. Air in the bladder is consistent with a sigmoid colon bladder fistula. 2. Tiny gallstone without evidence of acute cholecystitis or bile duct dilatation. 3. Splenomegaly without focal abnormality.    Electronically signed by: Carlos Taylor 1/15/2023 4:25 PM       CT-CTA CHEST PULMONARY ARTERY W/ RECONS    Result Date: 1/16/2023  IMPRESSION: 1. No evidence of pulmonary emboli. 2. New subsegmental atelectasis right lower lobe.    Electronically signed by: Carlos Taylor 1/16/2023 12:15 PM           Assessment/Plan  Problem Representation:    * Colovesical fistula (?)   Assessment & Plan  -Seen on imaging on 1/17 and again on 1/21.  Per Dr. Leong who evaluated patient at [facility] 1/17, recommended 2 weeks of antibiotics prior to proceeding with any  surgery.   -Prior cultures revealed resistance to ciprofloxacin.   -Repeat urine culture ESBL E Coli.   Plan:   -General surgery Dr. Hu consulted and recommended IV abx and repeat imaging   -Continue Meropenem (started 1/23)       Complicated Diverticulitis- (present on admission)  Assessment & Plan  -Complicated by multiple phlegmon and possible colovesicular fistula (seen on CT 1/21)  Plan:   -Liquid diet per surgery recommendations. If abd pain returns will make NPO  -Pain control with PRN tylenol   -IV meropenem   -repeat CT 1/27     Hyponatremia  Assessment & Plan  -Serum Osm 284 (WNL) indicating isotonic hyponatremia. Ddx hyperproteinuria or hyperlipidemia.   -128 on admission. Improving to 130s with PO intake.   -Pt has recurrent myalgias, could be sx of hyponatremia.  -Uosm and Amanda elevated.   -Lipid panel 6/6/22 WNL  -UA 1/20 notable for proteinuria.   Plan:   -can consider ordering urine protein creatinine ratio      Bacteremia (?)  Assessment & Plan  -Per outside hospital records blood cultures positive for gram positive cocci  -Repeat blood cultures negative.   -Requested [facility] records      COPD (chronic obstructive pulmonary disease) (HCC)  Assessment & Plan  -No PFTs on file  -Chest x-ray clear on admission, no wheezing on exam  -Symbicort daily  -As needed albuterol     Alcoholic cirrhosis of liver without ascites (HCC)  Assessment & Plan  -Cirrhotic changes seen on imaging  -Patient is mildly distended on exam however no clear signs of ascites.  No hepatic encephalopathy at this time.      History of methamphetamine use  Assessment & Plan  -Patient denies any use in the past several weeks  -Encourage continued sobriety     Nephrolithiasis  Assessment & Plan  -Nonobstructing nephrolithiasis, incidental finding on imaging.  -Continue to monitor renal function and urine output     Cholelithiasis  Assessment & Plan  -Asymptomatic  -Outpatient follow up      VTE prophylaxis: SCDs/TEDs    I have  performed a physical exam and reviewed and updated ROS and Plan today (1/26/2023). In review of yesterday's note (1/25/2023), there are no changes except as documented above.

## 2023-01-26 NOTE — PROGRESS NOTES
Surgical Progress Note    Author: Edgard Charles M.D.      Interval Events:  Patient tolerating clear liquid diet.  There has been complete resolution of abdominal pain.  He is urinating somewhat normally and having normal bowel movements now.  He feels well he states that he is bored and he wants to do surgery completely nontender      ROS  Hemodynamics:  Temp (24hrs), Av.6 °C (97.9 °F), Min:36.4 °C (97.5 °F), Max:37 °C (98.6 °F)  Temperature: 36.4 °C (97.6 °F)  Pulse  Av.3  Min: 52  Max: 103   Blood Pressure: 119/74     Respiratory:    Respiration: 16, Pulse Oximetry: 93 %           Neuro:  GCS       Fluids:    Intake/Output Summary (Last 24 hours) at 2023 1839  Last data filed at 2023 1700  Gross per 24 hour   Intake 840 ml   Output --   Net 840 ml        Current Diet Order   Procedures    Diet Order Diet: Clear Liquid     Physical Exam  Appears well  Neck supple  Regular heart rate  Normal respiratory effort  Abdomen soft, completely nontender  Ext ROM grossly intact  Skin pink and warm          Labs:  Recent Results (from the past 24 hour(s))   CBC WITH DIFFERENTIAL    Collection Time: 23  6:19 AM   Result Value Ref Range    WBC 12.1 (H) 4.8 - 10.8 K/uL    RBC 5.12 4.70 - 6.10 M/uL    Hemoglobin 15.9 14.0 - 18.0 g/dL    Hematocrit 46.8 42.0 - 52.0 %    MCV 91.4 81.4 - 97.8 fL    MCH 31.1 27.0 - 33.0 pg    MCHC 34.0 33.7 - 35.3 g/dL    RDW 42.1 35.9 - 50.0 fL    Platelet Count 390 164 - 446 K/uL    MPV 8.6 (L) 9.0 - 12.9 fL    Neutrophils-Polys 56.90 44.00 - 72.00 %    Lymphocytes 23.30 22.00 - 41.00 %    Monocytes 8.60 0.00 - 13.40 %    Eosinophils 8.60 (H) 0.00 - 6.90 %    Basophils 0.90 0.00 - 1.80 %    Nucleated RBC 0.00 /100 WBC    Neutrophils (Absolute) 6.88 1.82 - 7.42 K/uL    Lymphs (Absolute) 2.82 1.00 - 4.80 K/uL    Monos (Absolute) 1.04 (H) 0.00 - 0.85 K/uL    Eos (Absolute) 1.04 (H) 0.00 - 0.51 K/uL    Baso (Absolute) 0.11 0.00 - 0.12 K/uL    NRBC (Absolute) 0.00 K/uL    DIFFERENTIAL MANUAL    Collection Time: 01/25/23  6:19 AM   Result Value Ref Range    Myelocytes 1.70 %    Manual Diff Status PERFORMED    PERIPHERAL SMEAR REVIEW    Collection Time: 01/25/23  6:19 AM   Result Value Ref Range    Peripheral Smear Review see below    PLATELET ESTIMATE    Collection Time: 01/25/23  6:19 AM   Result Value Ref Range    Plt Estimation Normal    MORPHOLOGY    Collection Time: 01/25/23  6:19 AM   Result Value Ref Range    RBC Morphology Present     Toxic Gran Slight    OSMOLALITY SERUM    Collection Time: 01/25/23  8:59 AM   Result Value Ref Range    Osmolality Serum 284 278 - 298 mOsm/kg H2O     Medical Decision Making, by Problem:  Active Hospital Problems    Diagnosis     Colovesical fistula [N32.1]      Priority: High     Associated with acute complicated diverticulitis.     Recommend IV antibiotics and re-scan in about 5 days to evaluate for improvement. Symptoms have reportedly improved clinically over the past week since he's been in [place].     Discussed with patient that our only other option at this point is to do an open surgery and colostomy placement. After discussion we are in agreement that he would like a prolonged antibiotic course to try to avoid that.     Ok from my standpoint for a regular diet.       Diverticulitis [K57.92]     COPD (chronic obstructive pulmonary disease) (HCC) [J44.9]     Alcoholic cirrhosis of liver without ascites (HCC) [K70.30]     Hyponatremia [E87.1]     Cholelithiasis [K80.20]     Nephrolithiasis [N20.0]     History of methamphetamine use [F15.91]     Bacteremia [R78.81]      Plan:  61 y/o male with diverticulitis, colovesicular fistula, planned for sigmoid resection by Dr. Leong in near future  Appreciate medical care  Dvt prophylaxis  Broad spectrum IV abx  CLD  Plan for repeat CT on 1/27  Will follow    Quality Measures:  Quality-Core Measures    Discussed patient condition with Hospitalist and Patient

## 2023-01-26 NOTE — PROGRESS NOTES
Pt is A&O x4, RA, ambulatory w/ no assist. All medications taken and tolerated, no c/o pain at this time. Pt resting in bed w/ call light and belongings in reach, treaded socks on, hourly in place.

## 2023-01-26 NOTE — CARE PLAN
"The patient is Stable - Low risk of patient condition declining or worsening    Shift Goals  Clinical Goals: Safety, IV abx  Patient Goals: \"snacks\"  Family Goals: STEW    Progress made toward(s) clinical / shift goals:  Pt IV abx, steady gait.     Problem: Pain - Standard  Goal: Alleviation of pain or a reduction in pain to the patient’s comfort goal  Outcome: Progressing     Problem: Knowledge Deficit - Standard  Goal: Patient and family/care givers will demonstrate understanding of plan of care, disease process/condition, diagnostic tests and medications  Outcome: Progressing     Problem: Psychosocial Needs:  Goal: Ability to cope will improve  Outcome: Progressing     Problem: Urinary Elimination:  Goal: Ability to achieve and maintain adequate renal perfusion and functioning will improve  Outcome: Progressing  Goal: Ability to achieve a balanced intake and output will improve  Outcome: Progressing     "

## 2023-01-27 ENCOUNTER — APPOINTMENT (OUTPATIENT)
Dept: RADIOLOGY | Facility: MEDICAL CENTER | Age: 63
DRG: 660 | End: 2023-01-27
Attending: NURSE PRACTITIONER
Payer: COMMERCIAL

## 2023-01-27 LAB
BASOPHILS # BLD AUTO: 1.2 % (ref 0–1.8)
BASOPHILS # BLD: 0.16 K/UL (ref 0–0.12)
EOSINOPHIL # BLD AUTO: 0.29 K/UL (ref 0–0.51)
EOSINOPHIL NFR BLD: 2.1 % (ref 0–6.9)
ERYTHROCYTE [DISTWIDTH] IN BLOOD BY AUTOMATED COUNT: 42.8 FL (ref 35.9–50)
HCT VFR BLD AUTO: 49 % (ref 42–52)
HGB BLD-MCNC: 16.3 G/DL (ref 14–18)
IMM GRANULOCYTES # BLD AUTO: 0.12 K/UL (ref 0–0.11)
IMM GRANULOCYTES NFR BLD AUTO: 0.9 % (ref 0–0.9)
LYMPHOCYTES # BLD AUTO: 3.87 K/UL (ref 1–4.8)
LYMPHOCYTES NFR BLD: 28.2 % (ref 22–41)
MCH RBC QN AUTO: 31 PG (ref 27–33)
MCHC RBC AUTO-ENTMCNC: 33.3 G/DL (ref 33.7–35.3)
MCV RBC AUTO: 93.2 FL (ref 81.4–97.8)
MONOCYTES # BLD AUTO: 1.02 K/UL (ref 0–0.85)
MONOCYTES NFR BLD AUTO: 7.4 % (ref 0–13.4)
NEUTROPHILS # BLD AUTO: 8.26 K/UL (ref 1.82–7.42)
NEUTROPHILS NFR BLD: 60.2 % (ref 44–72)
NRBC # BLD AUTO: 0 K/UL
NRBC BLD-RTO: 0 /100 WBC
PLATELET # BLD AUTO: 352 K/UL (ref 164–446)
PMV BLD AUTO: 8.9 FL (ref 9–12.9)
RBC # BLD AUTO: 5.26 M/UL (ref 4.7–6.1)
WBC # BLD AUTO: 13.7 K/UL (ref 4.8–10.8)

## 2023-01-27 PROCEDURE — 700101 HCHG RX REV CODE 250: Performed by: SURGERY

## 2023-01-27 PROCEDURE — 700102 HCHG RX REV CODE 250 W/ 637 OVERRIDE(OP)

## 2023-01-27 PROCEDURE — A9270 NON-COVERED ITEM OR SERVICE: HCPCS

## 2023-01-27 PROCEDURE — A9270 NON-COVERED ITEM OR SERVICE: HCPCS | Performed by: SURGERY

## 2023-01-27 PROCEDURE — 700117 HCHG RX CONTRAST REV CODE 255: Performed by: NURSE PRACTITIONER

## 2023-01-27 PROCEDURE — 700102 HCHG RX REV CODE 250 W/ 637 OVERRIDE(OP): Performed by: SURGERY

## 2023-01-27 PROCEDURE — 700105 HCHG RX REV CODE 258

## 2023-01-27 PROCEDURE — 770001 HCHG ROOM/CARE - MED/SURG/GYN PRIV*

## 2023-01-27 PROCEDURE — 74177 CT ABD & PELVIS W/CONTRAST: CPT

## 2023-01-27 PROCEDURE — 85025 COMPLETE CBC W/AUTO DIFF WBC: CPT

## 2023-01-27 PROCEDURE — 99232 SBSQ HOSP IP/OBS MODERATE 35: CPT | Mod: 57 | Performed by: SURGERY

## 2023-01-27 PROCEDURE — 700111 HCHG RX REV CODE 636 W/ 250 OVERRIDE (IP)

## 2023-01-27 PROCEDURE — 99233 SBSQ HOSP IP/OBS HIGH 50: CPT | Mod: GC | Performed by: HOSPITALIST

## 2023-01-27 PROCEDURE — 36415 COLL VENOUS BLD VENIPUNCTURE: CPT

## 2023-01-27 RX ORDER — NEOMYCIN SULFATE 500 MG/1
1000 TABLET ORAL ONCE
Status: DISCONTINUED | OUTPATIENT
Start: 2023-01-27 | End: 2023-01-27

## 2023-01-27 RX ORDER — BISACODYL 5 MG
10 TABLET, DELAYED RELEASE (ENTERIC COATED) ORAL ONCE
Status: DISCONTINUED | OUTPATIENT
Start: 2023-01-27 | End: 2023-01-27

## 2023-01-27 RX ORDER — BISACODYL 5 MG
10 TABLET, DELAYED RELEASE (ENTERIC COATED) ORAL ONCE
Status: COMPLETED | OUTPATIENT
Start: 2023-01-27 | End: 2023-01-27

## 2023-01-27 RX ORDER — METRONIDAZOLE 500 MG/1
500 TABLET ORAL ONCE
Status: COMPLETED | OUTPATIENT
Start: 2023-01-27 | End: 2023-01-27

## 2023-01-27 RX ORDER — METRONIDAZOLE 500 MG/1
500 TABLET ORAL ONCE
Status: COMPLETED | OUTPATIENT
Start: 2023-01-28 | End: 2023-01-28

## 2023-01-27 RX ORDER — METRONIDAZOLE 500 MG/1
1000 TABLET ORAL ONCE
Status: DISCONTINUED | OUTPATIENT
Start: 2023-01-27 | End: 2023-01-27

## 2023-01-27 RX ORDER — NEOMYCIN SULFATE 500 MG/1
1000 TABLET ORAL ONCE
Status: DISCONTINUED | OUTPATIENT
Start: 2023-01-28 | End: 2023-01-27

## 2023-01-27 RX ORDER — METRONIDAZOLE 500 MG/1
500 TABLET ORAL ONCE
Status: DISCONTINUED | OUTPATIENT
Start: 2023-01-27 | End: 2023-01-27

## 2023-01-27 RX ADMIN — ENOXAPARIN SODIUM 40 MG: 40 INJECTION SUBCUTANEOUS at 18:27

## 2023-01-27 RX ADMIN — METRONIDAZOLE 500 MG: 500 TABLET ORAL at 18:27

## 2023-01-27 RX ADMIN — IOHEXOL 95 ML: 350 INJECTION, SOLUTION INTRAVENOUS at 13:45

## 2023-01-27 RX ADMIN — BISACODYL 10 MG: 5 TABLET, COATED ORAL at 18:26

## 2023-01-27 RX ADMIN — Medication 5 MG: at 20:18

## 2023-01-27 RX ADMIN — MEROPENEM 500 MG: 500 INJECTION, POWDER, FOR SOLUTION INTRAVENOUS at 01:36

## 2023-01-27 RX ADMIN — IOHEXOL 20 ML: 240 INJECTION, SOLUTION INTRATHECAL; INTRAVASCULAR; INTRAVENOUS; ORAL at 13:45

## 2023-01-27 RX ADMIN — MEROPENEM 500 MG: 500 INJECTION, POWDER, FOR SOLUTION INTRAVENOUS at 20:17

## 2023-01-27 RX ADMIN — BUDESONIDE AND FORMOTEROL FUMARATE DIHYDRATE 2 PUFF: 160; 4.5 AEROSOL RESPIRATORY (INHALATION) at 04:40

## 2023-01-27 RX ADMIN — BUDESONIDE AND FORMOTEROL FUMARATE DIHYDRATE 2 PUFF: 160; 4.5 AEROSOL RESPIRATORY (INHALATION) at 18:30

## 2023-01-27 RX ADMIN — MEROPENEM 500 MG: 500 INJECTION, POWDER, FOR SOLUTION INTRAVENOUS at 08:50

## 2023-01-27 RX ADMIN — MEROPENEM 500 MG: 500 INJECTION, POWDER, FOR SOLUTION INTRAVENOUS at 14:02

## 2023-01-27 RX ADMIN — METRONIDAZOLE 500 MG: 500 TABLET ORAL at 20:18

## 2023-01-27 RX ADMIN — POLYETHYLENE GLYCOL 3350, SODIUM SULFATE ANHYDROUS, SODIUM BICARBONATE, SODIUM CHLORIDE, POTASSIUM CHLORIDE 2 L: 236; 22.74; 6.74; 5.86; 2.97 POWDER, FOR SOLUTION ORAL at 14:03

## 2023-01-27 ASSESSMENT — PATIENT HEALTH QUESTIONNAIRE - PHQ9
1. LITTLE INTEREST OR PLEASURE IN DOING THINGS: NOT AT ALL
2. FEELING DOWN, DEPRESSED, IRRITABLE, OR HOPELESS: NOT AT ALL
SUM OF ALL RESPONSES TO PHQ9 QUESTIONS 1 AND 2: 0

## 2023-01-27 ASSESSMENT — LIFESTYLE VARIABLES
HAVE PEOPLE ANNOYED YOU BY CRITICIZING YOUR DRINKING: NO
HOW MANY TIMES IN THE PAST YEAR HAVE YOU HAD 5 OR MORE DRINKS IN A DAY: 0
ALCOHOL_USE: NO
ON A TYPICAL DAY WHEN YOU DRINK ALCOHOL HOW MANY DRINKS DO YOU HAVE: 0
TOTAL SCORE: 0
EVER FELT BAD OR GUILTY ABOUT YOUR DRINKING: NO
EVER HAD A DRINK FIRST THING IN THE MORNING TO STEADY YOUR NERVES TO GET RID OF A HANGOVER: NO
AVERAGE NUMBER OF DAYS PER WEEK YOU HAVE A DRINK CONTAINING ALCOHOL: 0
CONSUMPTION TOTAL: NEGATIVE
HAVE YOU EVER FELT YOU SHOULD CUT DOWN ON YOUR DRINKING: NO
TOTAL SCORE: 0
TOTAL SCORE: 0

## 2023-01-27 ASSESSMENT — PAIN DESCRIPTION - PAIN TYPE
TYPE: ACUTE PAIN
TYPE: ACUTE PAIN

## 2023-01-27 ASSESSMENT — FIBROSIS 4 INDEX: FIB4 SCORE: 1.14

## 2023-01-27 NOTE — CARE PLAN
"The patient is Stable - Low risk of patient condition declining or worsening    Shift Goals  Clinical Goals: saftey  Patient Goals: \"real food\"  Family Goals: celestina    Progress made toward(s) clinical / shift goals:  pt denies pain at this time      Problem: Pain - Standard  Goal: Alleviation of pain or a reduction in pain to the patient’s comfort goal  Outcome: Progressing         "

## 2023-01-27 NOTE — PROGRESS NOTES
Assumed care of pt a&ox4. Pt denies pain. Pt educated on POC. Bed locked and in the lowest position, call light within reach, all needs met at this time.

## 2023-01-27 NOTE — PROGRESS NOTES
DATE: 1/27/2023    Hospital Day 7  colovesical fistula .    INTERVAL EVENTS:  Overall feels well   had normal bowel movement  No abdominal pain currently  Continued pneumaturia  .    REVIEW OF SYSTEMS: Comprehensive review of systems is negative with the exception of the aforementioned HPI, PMH, and PSH bullets in accordance with CMS guidelines.    PHYSICAL EXAMINATION:      Vital Signs: /71   Pulse 61   Temp 36.7 °C (98 °F) (Temporal)   Resp 17   Ht 1.829 m (6')   Wt 79.7 kg (175 lb 11.3 oz)   SpO2 93%   Physical Exam  General: Laying in bed and in no distress  HEENT: Pupils equally round reactive to light, extraocular muscles intact, oropharynx without lesions  Neck: Supple with full range of motion  Chest: Bilateral breath sounds with symmetrical chest excursion  Cardiovascular: Regular rate and rhythm  Abdomen: Soft, nontender, nondistended  Extremities: No clubbing, cyanosis, or edema  Neurologic: Grossly intact  Vascular: Palpable radial femoral pulses  Skin: Warm and dry  Psychiatric: Interacts appropriately  LABORATORY VALUES:   Recent Labs     01/25/23  0619 01/26/23  0142 01/27/23  0054   WBC 12.1* 12.4* 13.7*   RBC 5.12 5.22 5.26   HEMOGLOBIN 15.9 16.3 16.3   HEMATOCRIT 46.8 48.5 49.0   MCV 91.4 92.9 93.2   MCH 31.1 31.2 31.0   MCHC 34.0 33.6* 33.3*   RDW 42.1 43.1 42.8   PLATELETCT 390 386 352   MPV 8.6* 8.8* 8.9*                    IMAGING:   CT-ABDOMEN-PELVIS WITH   Final Result         1.  Changes of sigmoid diverticulitis, small collection of fluid with focus of air adjacent to sigmoid colon is seen compatible with microperforation and small diverticular abscess.   2.  Colovesicular fistula is seen with air in the bladder.   3.  Irregular hepatic contour favoring changes of cirrhosis   4.  Segment 2 duodenal diverticula   5.  Small fat-containing right inguinal hernia   6.  Left nephrolithiasis   7.  Cholelithiasis   8.  Atherosclerosis      These findings were discussed with the  patient's clinician, Kenji Coronel, on 1/21/2023 12:22 AM.      CT-ABDOMEN-PELVIS WITH    (Results Pending)       ASSESSMENT AND PLAN:     * Colovesical fistula- (present on admission)  Assessment & Plan  Associated with acute complicated diverticulitis.   - Recommend IV antibiotics and re-scan in about 5 days (1/27) to evaluate for improvement. Improvement/resolution of the acute diverticulitis would allow a better chance at having a minimally invasive one stage operation.    - Other option at this point is to do an open surgery and colostomy placement, however patient would prefer to avoid. Agreeable to prolonged antibiotic course to try to avoid that.  - Regular diet.  Discussed with colorectal surgery (Dr. Leong) to see 1/30 for eval update.    Clinically stable  May be a candidate for an open procedure with a protective ileostomy  Given his social and economic status, he is not a good candidate for outpatient nonoperative management  We will plan on an open repair tomorrow a.m. with potentially a protective ileostomy.  We will ask urology to place stents preoperatively.  Bowel prep today         ____________________________________     Pb Sparrow M.D.    DD: 1/27/2023  9:48 AM

## 2023-01-27 NOTE — PROGRESS NOTES
[facility] Internal Medicine Daily Progress Note    Date of Service  1/27/2023    [facility] Team: [facility] IM White Team   Attending: Joni Nieves M.d.  Senior Resident: Dr. De Los Santos  Intern:  Dr. Noe  Contact Number: [phone]    Chief Complaint  Jason Yip is a 62 y.o. male admitted 1/20/2023 with abdominal pain.     Hospital Course  Jason Yip is a 62 y.o. male who presented 1/20/2023 abdominal pain.   He has a past medical history of cirrhosis (seen on imaging) COPD and polysubstance use (however patient states that he quit several weeks ago). Patient initially presented to [facility] in [facility] on 1/4/2023 reporting 2 months of difficulty with urination. On initial presentation he was found to be septic with a leukocytosis of over 14,000.  At the time CT abdomen pelvis revealed severe sigmoid diverticulitis with inflammatory phlegmon formation and possible colovesicular fistula. At that time he was treated with ertapenem, Flagyl, and also treated for alcohol withdrawal.  Surgery was consulted and recommended no surgical intervention at the time.   He later presented to [facility] again on 1/15/2023 for evaluation of lower abdominal pain and bubbles in his urine.  Urine cultures at the time revealed resistance to ciprofloxacin.  Repeat CT revealed sigmoid colovesicular fistula and patient was transferred to [facility] for higher level of care on 1/17/2023.  (Records not available from [facility]) Per patient while he was at [facility] he was told that he required IV antibiotics and surgical intervention on outpatient basis after antibiotic treatment for 2 weeks and was discharged on 1/19/2023. Patient states that he was discharged on p.o. Augmentin with a Taxi voucher that took him somewhere in [place] rather than back to [place] and he has been homeless since then. He presented to [facility] and was discharged back to the  shelter 1/19. He was brought by ambulance from [facility] on 1/20 for continued abd pain.     While admitted here general surgery was consulted and recommended liquid diet, broad spectrum abx and plan for possible sigmoid resection by Dr. Leong. Urine culture from admission was positive for ESBL e coli on 1/23 so patient was started on Meropenem.     Interval Problem Update  This morning Mr Yip said he was doing well, he said he has still had no pain with urination or bms and denied any fecal matter in his urine. Still having pneumaturia.   -Surgery plans for open repair tomorrow a.m. with potentially a protective ileostomy.   -CT A/P completed, results pending.   -Bowel prep today   -NPO midnight     I have discussed this patient's plan of care and discharge plan at IDT rounds today with Case Management, Nursing, Nursing leadership, and other members of the IDT team.    Consultants/Specialty  general surgery    Code Status  Full Code    Disposition  Patient is not medically cleared for discharge.   Anticipate discharge to to home with close outpatient follow-up.  I have placed the appropriate orders for post-discharge needs.    Review of Systems  Review of Systems   Constitutional:  Negative for chills, fever and malaise/fatigue.   HENT:  Negative for congestion and sore throat.    Respiratory:  Negative for cough, sputum production, shortness of breath.   Cardiovascular:  Negative for chest pain, palpitations and orthopnea.   Gastrointestinal:  Negative for abdominal pain, constipation, diarrhea, heartburn, melena, nausea and vomiting.   Genitourinary:  Negative for dysuria, flank pain, frequency, hematuria and urgency. Positive for pneumaturia.   Musculoskeletal:  Negative for myalgias, back pain and neck pain.   Neurological:  Negative for dizziness, tremors, weakness and headaches.      Physical Exam  Temp:  [36.1 °C (96.9 °F)-36.7 °C (98 °F)] 36.7 °C (98 °F)  Pulse:  [61-94] 61  Resp:  [16-85] 17  BP:  (108-133)/(71-95) 108/71  SpO2:  [93 %-96 %] 93 %    Physical Exam  Vitals and nursing note reviewed.   Constitutional:       General: He is not in acute distress.     Appearance: Normal appearance. He is normal weight. He is not ill-appearing or toxic-appearing.    Cardiovascular:      Rate and Rhythm: Normal rate and regular rhythm.      Pulses: Normal pulses.      Heart sounds: Normal heart sounds. No murmur heard.    No friction rub. No gallop.   Pulmonary:      Effort: Pulmonary effort is normal. No respiratory distress.      Breath sounds: Normal breath sounds. No stridor. No wheezing, rhonchi or rales.   Abdominal:      General: Abdomen is flat. Bowel sounds are normal. There is no distension.      Palpations: Abdomen is soft.      Tenderness: There is no abdominal tenderness. There is no guarding or rebound.   Neurological:      Mental Status: He is alert and oriented to person, place, and time.      Comment: Ambulated with patient; no difficulty with ambulation, regular gait.     Fluids    Intake/Output Summary (Last 24 hours) at 1/27/2023 1347  Last data filed at 1/27/2023 0850  Gross per 24 hour   Intake 840 ml   Output --   Net 840 ml       Laboratory  Recent Labs     01/25/23  0619 01/26/23  0142 01/27/23  0054   WBC 12.1* 12.4* 13.7*   RBC 5.12 5.22 5.26   HEMOGLOBIN 15.9 16.3 16.3   HEMATOCRIT 46.8 48.5 49.0   MCV 91.4 92.9 93.2   MCH 31.1 31.2 31.0   MCHC 34.0 33.6* 33.3*   RDW 42.1 43.1 42.8   PLATELETCT 390 386 352   MPV 8.6* 8.8* 8.9*                       Imaging  CT-ABDOMEN-PELVIS WITH    Result Date: 1/21/2023  1.  Changes of sigmoid diverticulitis, small collection of fluid with focus of air adjacent to sigmoid colon is seen compatible with microperforation and small diverticular abscess. 2.  Colovesicular fistula is seen with air in the bladder. 3.  Irregular hepatic contour favoring changes of cirrhosis 4.  Segment 2 duodenal diverticula 5.  Small fat-containing right inguinal hernia 6.   Left nephrolithiasis 7.  Cholelithiasis 8.  Atherosclerosis These findings were discussed with the patient's clinician, Kenji Coronel, on 1/21/2023 12:22 AM.    CT-RENAL COLIC EVALUATION(A/P W/O)    Result Date: 1/4/2023  IMPRESSION: 1. Severe sigmoid diverticulosis with severe diverticulitis with inflammatory phlegmon along the right with inflammatory changes extending to the dome of the bladder and 2 adjacent loop of small bowel. No drainable abscess is present. 2. These findings were discussed with Dr. Gore shortly following completion of the study.    Electronically signed by: Carlos Taylor 1/4/2023 11:39 AM       [id number] VIEW    Result Date: 1/15/2023  IMPRESSION: 1. Mild adynamic ileus.       Electronically signed by: Carlos Taylor 1/15/2023 12:52 PM       DX-CHEST-PORTABLE (1 VIEW)    Result Date: 1/15/2023  IMPRESSION: 1. No evidence of acute process in the chest.    Electronically signed by: Carlso Taylor 1/15/2023 12:50 PM       CT ABDOMEN-PELVIS WITH IV CONTRAST    Result Date: 1/15/2023  IMPRESSION: 1. Severe sigmoid diverticulitis with probable multiple small abscesses in the surrounding tissues. No large drainable abscess is seen. The inflammatory changes extend to the bladder. Air in the bladder is consistent with a sigmoid colon bladder fistula. 2. Tiny gallstone without evidence of acute cholecystitis or bile duct dilatation. 3. Splenomegaly without focal abnormality.    Electronically signed by: Carlos Taylor 1/15/2023 4:25 PM       CT-CTA CHEST PULMONARY ARTERY W/ RECONS    Result Date: 1/16/2023  IMPRESSION: 1. No evidence of pulmonary emboli. 2. New subsegmental atelectasis right lower lobe.    Electronically signed by: Carlos Taylor 1/16/2023 12:15 PM           Assessment/Plan  Problem Representation:    * Colovesical fistula    Assessment & Plan  -Seen on imaging on 1/17 and again on 1/21. Dx confirmed by air in bladder and by sx of pneumaturia.   -Per   Salo who evaluated patient at [facility] 1/17, recommended 2 weeks of antibiotics prior to proceeding with any surgery.   -Prior cultures revealed resistance to ciprofloxacin.   -Repeat urine culture ESBL E Coli.   Plan:   -Continue Meropenem (started 1/23)   -possible open repair 1/28      Complicated Diverticulitis- (present on admission)  Assessment & Plan  -Complicated by multiple phlegmon and possible colovesicular fistula (seen on CT 1/21)  Plan:   -Pain control with PRN tylenol   -IV meropenem   -repeat CT 1/27  -Bowel prep and NPO at midnight 1/27 for possible open repair 1/28 a.m. with potentially a protective ileostomy.      Hyponatremia  Assessment & Plan  -Serum Osm 284 (WNL) indicating isotonic hyponatremia. Ddx hyperproteinuria or hyperlipidemia.   -128 on admission. Improving to 130s with PO intake.   -Pt has recurrent myalgias, could be sx of hyponatremia.  -Uosm and Amanda elevated.   -Lipid panel 6/6/22 WNL  -UA 1/20 notable for proteinuria.   Plan:   -can consider ordering urine protein creatinine ratio if hyponatremia worsens.      Bacteremia (?)  Assessment & Plan  -Per outside hospital records blood cultures positive for gram positive cocci  -Repeat blood cultures negative.   -Requested [facility] records      COPD (chronic obstructive pulmonary disease) (HCC)  Assessment & Plan  -No PFTs on file  -Chest x-ray clear on admission, no wheezing on exam  -Symbicort daily  -As needed albuterol     Alcoholic cirrhosis of liver without ascites (HCC)  Assessment & Plan  -Cirrhotic changes seen on imaging  -Patient is mildly distended on exam however no clear signs of ascites.  No hepatic encephalopathy at this time.      History of methamphetamine use  Assessment & Plan  -Patient denies any use in the past several weeks  -Encourage continued sobriety     Nephrolithiasis  Assessment & Plan  -Nonobstructing nephrolithiasis, incidental finding on imaging.  -Continue to monitor renal function  and urine output     Cholelithiasis  Assessment & Plan  -Asymptomatic  -Outpatient follow up      VTE prophylaxis: SCDs/TEDs    I have performed a physical exam and reviewed and updated ROS and Plan today (1/27/2023). In review of yesterday's note (1/26/2023), there are no changes except as documented above.

## 2023-01-27 NOTE — CARE PLAN
"The patient is Stable - Low risk of patient condition declining or worsening    Shift Goals  Clinical Goals: safety  Patient Goals: \"real food\"  Family Goals: STEW    Progress made toward(s) clinical / shift goals:    Problem: Pain - Standard  Goal: Alleviation of pain or a reduction in pain to the patient’s comfort goal  Outcome: Progressing     Problem: Knowledge Deficit - Standard  Goal: Patient and family/care givers will demonstrate understanding of plan of care, disease process/condition, diagnostic tests and medications  Outcome: Progressing     Problem: Psychosocial Needs:  Goal: Ability to cope will improve  Outcome: Progressing     Problem: Urinary Elimination:  Goal: Ability to achieve and maintain adequate renal perfusion and functioning will improve  Outcome: Progressing  Goal: Ability to achieve a balanced intake and output will improve  Outcome: Progressing     Problem: Physical Regulation:  Goal: Diagnostic test results will improve  Outcome: Progressing     Problem: Knowledge Deficit:  Goal: Patient's knowledge of the prescribed therapeutic regimen will improve  Outcome: Progressing     Problem: Psychosocial  Goal: Patient's level of anxiety will decrease  Outcome: Progressing  Goal: Patient's ability to verbalize feelings about condition will improve  Outcome: Progressing  Goal: Patient's ability to re-evaluate and adapt role responsibilities will improve  Outcome: Progressing  Goal: Patient and family will demonstrate ability to cope with life altering diagnosis and/or procedure  Outcome: Progressing  Goal: Spiritual and cultural needs incorporated into hospitalization  Outcome: Progressing     Problem: Communication  Goal: The ability to communicate needs accurately and effectively will improve  Outcome: Progressing     Problem: Discharge Barriers/Planning  Goal: Patient's continuum of care needs are met  Outcome: Progressing     Problem: Hemodynamics  Goal: Patient's hemodynamics, fluid balance " and neurologic status will be stable or improve  Outcome: Progressing     Problem: Respiratory  Goal: Patient will achieve/maintain optimum respiratory ventilation and gas exchange  Outcome: Progressing     Problem: Chest Tube Management  Goal: Complications related to chest tube will be avoided or minimized  Outcome: Progressing     Problem: Nutrition  Goal: Patient's nutritional and fluid intake will be adequate or improve  Outcome: Progressing  Goal: Enteral nutrition will be maintained or improve  Outcome: Progressing  Goal: Enteral nutrition will be maintained or improve  Outcome: Progressing     Problem: Urinary Elimination  Goal: Establish and maintain regular urinary output  Outcome: Progressing     Problem: Bowel Elimination  Goal: Establish and maintain regular bowel function  Outcome: Progressing     Problem: Gastrointestinal Irritability  Goal: Nausea and vomiting will be absent or improve  Outcome: Progressing  Goal: Diarrhea will be absent or improved  Outcome: Progressing     Problem: Mobility  Goal: Patient's capacity to carry out activities will improve  Outcome: Progressing     Problem: Self Care  Goal: Patient will have the ability to perform ADLs independently or with assistance (bathe, groom, dress, toilet and feed)  Outcome: Progressing     Problem: Infection - Standard  Goal: Patient will remain free from infection  Outcome: Progressing     Problem: Wound/ / Incision Healing  Goal: Patient's wound/surgical incision will decrease in size and heals properly  Outcome: Progressing     Problem: Fall Risk  Goal: Patient will remain free from falls  Outcome: Progressing       Patient is not progressing towards the following goals:

## 2023-01-27 NOTE — PROGRESS NOTES
Received report from previous shift RN, assumed care of patient. Patient is A&Ox4, vital signs are stable, on room air. Patient denies pain at this time, no signs of distress or discomfort. Sitting up in bed for breakfast. Call light and belongings within reach. Bed in lowest/locked position. Hourly rounding in place.

## 2023-01-27 NOTE — PROGRESS NOTES
Surgical Progress Note    Author: Edgard Charles M.D.      Interval Events:  Patient tolerating clear liquid diet well, no nausea. Pain resolved, states he had a normal BM today. No fevers, WBC stable at 12    ROS  Hemodynamics:  Temp (24hrs), Av.4 °C (97.6 °F), Min:36.1 °C (96.9 °F), Max:36.7 °C (98 °F)  Temperature: 36.1 °C (96.9 °F)  Pulse  Av.8  Min: 52  Max: 103   Blood Pressure: (!) 133/95     Respiratory:    Respiration: (!) 85, Pulse Oximetry: 96 %           Neuro:  GCS       Fluids:    Intake/Output Summary (Last 24 hours) at 2023 1839  Last data filed at 2023 1700  Gross per 24 hour   Intake 840 ml   Output --   Net 840 ml        Current Diet Order   Procedures    Diet Order Diet: Full Liquid     Physical Exam  Appears well  Neck supple  Regular heart rate  Normal respiratory effort  Abdomen soft, completely nontender  Ext ROM grossly intact  Skin pink and warm          Labs:  Recent Results (from the past 24 hour(s))   CBC WITH DIFFERENTIAL    Collection Time: 23  1:42 AM   Result Value Ref Range    WBC 12.4 (H) 4.8 - 10.8 K/uL    RBC 5.22 4.70 - 6.10 M/uL    Hemoglobin 16.3 14.0 - 18.0 g/dL    Hematocrit 48.5 42.0 - 52.0 %    MCV 92.9 81.4 - 97.8 fL    MCH 31.2 27.0 - 33.0 pg    MCHC 33.6 (L) 33.7 - 35.3 g/dL    RDW 43.1 35.9 - 50.0 fL    Platelet Count 386 164 - 446 K/uL    MPV 8.8 (L) 9.0 - 12.9 fL    Neutrophils-Polys 56.60 44.00 - 72.00 %    Lymphocytes 30.90 22.00 - 41.00 %    Monocytes 8.20 0.00 - 13.40 %    Eosinophils 1.90 0.00 - 6.90 %    Basophils 1.30 0.00 - 1.80 %    Immature Granulocytes 1.10 (H) 0.00 - 0.90 %    Nucleated RBC 0.00 /100 WBC    Neutrophils (Absolute) 7.01 1.82 - 7.42 K/uL    Lymphs (Absolute) 3.82 1.00 - 4.80 K/uL    Monos (Absolute) 1.01 (H) 0.00 - 0.85 K/uL    Eos (Absolute) 0.24 0.00 - 0.51 K/uL    Baso (Absolute) 0.16 (H) 0.00 - 0.12 K/uL    Immature Granulocytes (abs) 0.13 (H) 0.00 - 0.11 K/uL    NRBC (Absolute) 0.00 K/uL     Medical  Decision Making, by Problem:  Active Hospital Problems    Diagnosis     Colovesical fistula [N32.1]      Priority: High     Associated with acute complicated diverticulitis.     Recommend IV antibiotics and re-scan in about 5 days to evaluate for improvement. Symptoms have reportedly improved clinically over the past week since he's been in [place].     Discussed with patient that our only other option at this point is to do an open surgery and colostomy placement. After discussion we are in agreement that he would like a prolonged antibiotic course to try to avoid that.     Ok from my standpoint for a regular diet.       Diverticulitis [K57.92]     COPD (chronic obstructive pulmonary disease) (HCC) [J44.9]     Alcoholic cirrhosis of liver without ascites (HCC) [K70.30]     Hyponatremia [E87.1]     Cholelithiasis [K80.20]     Nephrolithiasis [N20.0]     History of methamphetamine use [F15.91]     Bacteremia [R78.81]      Plan:  63 y/o male with diverticulitis, colovesicular fistula, planned for robotic single stage sigmoid resection by Dr. Leong in near future if possible  Appreciate medical care  Dvt prophylaxis  Broad spectrum IV abx  FLD ok  Plan for repeat CT on 1/27, Dr. Leong to direct surgical timing  Will follow    Quality Measures:  Quality-Core Measures    Discussed patient condition with Hospitalist and Patient

## 2023-01-28 ENCOUNTER — ANESTHESIA (OUTPATIENT)
Dept: SURGERY | Facility: MEDICAL CENTER | Age: 63
DRG: 660 | End: 2023-01-28
Payer: COMMERCIAL

## 2023-01-28 ENCOUNTER — ANESTHESIA EVENT (OUTPATIENT)
Dept: SURGERY | Facility: MEDICAL CENTER | Age: 63
DRG: 660 | End: 2023-01-28
Payer: COMMERCIAL

## 2023-01-28 LAB
ALBUMIN SERPL BCP-MCNC: 2.9 G/DL (ref 3.2–4.9)
ALBUMIN/GLOB SERPL: 0.7 G/DL
ALP SERPL-CCNC: 148 U/L (ref 30–99)
ALT SERPL-CCNC: 49 U/L (ref 2–50)
ANION GAP SERPL CALC-SCNC: 10 MMOL/L (ref 7–16)
APTT PPP: 34.1 SEC (ref 24.7–36)
AST SERPL-CCNC: 57 U/L (ref 12–45)
BASOPHILS # BLD AUTO: 1.1 % (ref 0–1.8)
BASOPHILS # BLD: 0.13 K/UL (ref 0–0.12)
BILIRUB SERPL-MCNC: 1.3 MG/DL (ref 0.1–1.5)
BUN SERPL-MCNC: 11 MG/DL (ref 8–22)
CALCIUM ALBUM COR SERPL-MCNC: 10.1 MG/DL (ref 8.5–10.5)
CALCIUM SERPL-MCNC: 9.2 MG/DL (ref 8.5–10.5)
CHLORIDE SERPL-SCNC: 97 MMOL/L (ref 96–112)
CO2 SERPL-SCNC: 24 MMOL/L (ref 20–33)
CREAT SERPL-MCNC: 0.67 MG/DL (ref 0.5–1.4)
EOSINOPHIL # BLD AUTO: 0.23 K/UL (ref 0–0.51)
EOSINOPHIL NFR BLD: 2 % (ref 0–6.9)
ERYTHROCYTE [DISTWIDTH] IN BLOOD BY AUTOMATED COUNT: 41.8 FL (ref 35.9–50)
GFR SERPLBLD CREATININE-BSD FMLA CKD-EPI: 105 ML/MIN/1.73 M 2
GLOBULIN SER CALC-MCNC: 4.3 G/DL (ref 1.9–3.5)
GLUCOSE SERPL-MCNC: 110 MG/DL (ref 65–99)
HCT VFR BLD AUTO: 46.7 % (ref 42–52)
HGB BLD-MCNC: 15.9 G/DL (ref 14–18)
IMM GRANULOCYTES # BLD AUTO: 0.11 K/UL (ref 0–0.11)
IMM GRANULOCYTES NFR BLD AUTO: 1 % (ref 0–0.9)
INR PPP: 1.04 (ref 0.87–1.13)
LYMPHOCYTES # BLD AUTO: 2.88 K/UL (ref 1–4.8)
LYMPHOCYTES NFR BLD: 25 % (ref 22–41)
MCH RBC QN AUTO: 30.9 PG (ref 27–33)
MCHC RBC AUTO-ENTMCNC: 34 G/DL (ref 33.7–35.3)
MCV RBC AUTO: 90.7 FL (ref 81.4–97.8)
MONOCYTES # BLD AUTO: 1.12 K/UL (ref 0–0.85)
MONOCYTES NFR BLD AUTO: 9.7 % (ref 0–13.4)
NEUTROPHILS # BLD AUTO: 7.04 K/UL (ref 1.82–7.42)
NEUTROPHILS NFR BLD: 61.2 % (ref 44–72)
NRBC # BLD AUTO: 0 K/UL
NRBC BLD-RTO: 0 /100 WBC
PLATELET # BLD AUTO: 309 K/UL (ref 164–446)
PMV BLD AUTO: 8.6 FL (ref 9–12.9)
POTASSIUM SERPL-SCNC: 4.4 MMOL/L (ref 3.6–5.5)
PROT SERPL-MCNC: 7.2 G/DL (ref 6–8.2)
PROTHROMBIN TIME: 13.5 SEC (ref 12–14.6)
RBC # BLD AUTO: 5.15 M/UL (ref 4.7–6.1)
SODIUM SERPL-SCNC: 131 MMOL/L (ref 135–145)
WBC # BLD AUTO: 11.5 K/UL (ref 4.8–10.8)

## 2023-01-28 PROCEDURE — 700111 HCHG RX REV CODE 636 W/ 250 OVERRIDE (IP)

## 2023-01-28 PROCEDURE — 0DBP0ZZ EXCISION OF RECTUM, OPEN APPROACH: ICD-10-PCS | Performed by: SURGERY

## 2023-01-28 PROCEDURE — 99233 SBSQ HOSP IP/OBS HIGH 50: CPT | Mod: GC | Performed by: HOSPITALIST

## 2023-01-28 PROCEDURE — 85610 PROTHROMBIN TIME: CPT

## 2023-01-28 PROCEDURE — 700105 HCHG RX REV CODE 258

## 2023-01-28 PROCEDURE — 700105 HCHG RX REV CODE 258: Performed by: ANESTHESIOLOGY

## 2023-01-28 PROCEDURE — 80053 COMPREHEN METABOLIC PANEL: CPT

## 2023-01-28 PROCEDURE — 44139 MOBILIZATION OF COLON: CPT | Mod: 59 | Performed by: SURGERY

## 2023-01-28 PROCEDURE — 160036 HCHG PACU - EA ADDL 30 MINS PHASE I: Performed by: SURGERY

## 2023-01-28 PROCEDURE — 700111 HCHG RX REV CODE 636 W/ 250 OVERRIDE (IP): Performed by: ANESTHESIOLOGY

## 2023-01-28 PROCEDURE — 99140 ANES COMP EMERGENCY COND: CPT | Performed by: ANESTHESIOLOGY

## 2023-01-28 PROCEDURE — 0T788DZ DILATION OF BILATERAL URETERS WITH INTRALUMINAL DEVICE, VIA NATURAL OR ARTIFICIAL OPENING ENDOSCOPIC: ICD-10-PCS | Performed by: UROLOGY

## 2023-01-28 PROCEDURE — A9270 NON-COVERED ITEM OR SERVICE: HCPCS | Performed by: SURGERY

## 2023-01-28 PROCEDURE — 3E0T3BZ INTRODUCTION OF ANESTHETIC AGENT INTO PERIPHERAL NERVES AND PLEXI, PERCUTANEOUS APPROACH: ICD-10-PCS | Performed by: SURGERY

## 2023-01-28 PROCEDURE — 0T7D8ZZ DILATION OF URETHRA, VIA NATURAL OR ARTIFICIAL OPENING ENDOSCOPIC: ICD-10-PCS | Performed by: UROLOGY

## 2023-01-28 PROCEDURE — 36415 COLL VENOUS BLD VENIPUNCTURE: CPT

## 2023-01-28 PROCEDURE — 160041 HCHG SURGERY MINUTES - EA ADDL 1 MIN LEVEL 4: Performed by: SURGERY

## 2023-01-28 PROCEDURE — 700102 HCHG RX REV CODE 250 W/ 637 OVERRIDE(OP): Performed by: SURGERY

## 2023-01-28 PROCEDURE — 700105 HCHG RX REV CODE 258: Performed by: HOSPITALIST

## 2023-01-28 PROCEDURE — 85025 COMPLETE CBC W/AUTO DIFF WBC: CPT

## 2023-01-28 PROCEDURE — 160009 HCHG ANES TIME/MIN: Performed by: SURGERY

## 2023-01-28 PROCEDURE — 88307 TISSUE EXAM BY PATHOLOGIST: CPT

## 2023-01-28 PROCEDURE — 770001 HCHG ROOM/CARE - MED/SURG/GYN PRIV*

## 2023-01-28 PROCEDURE — 700102 HCHG RX REV CODE 250 W/ 637 OVERRIDE(OP)

## 2023-01-28 PROCEDURE — 00860 ANES XTRPRTL PX LWR ABD NOS: CPT | Performed by: ANESTHESIOLOGY

## 2023-01-28 PROCEDURE — 160002 HCHG RECOVERY MINUTES (STAT): Performed by: SURGERY

## 2023-01-28 PROCEDURE — C1769 GUIDE WIRE: HCPCS | Performed by: SURGERY

## 2023-01-28 PROCEDURE — 160048 HCHG OR STATISTICAL LEVEL 1-5: Performed by: SURGERY

## 2023-01-28 PROCEDURE — 160035 HCHG PACU - 1ST 60 MINS PHASE I: Performed by: SURGERY

## 2023-01-28 PROCEDURE — 700102 HCHG RX REV CODE 250 W/ 637 OVERRIDE(OP): Performed by: ANESTHESIOLOGY

## 2023-01-28 PROCEDURE — 44140 PARTIAL REMOVAL OF COLON: CPT | Performed by: SURGERY

## 2023-01-28 PROCEDURE — 700111 HCHG RX REV CODE 636 W/ 250 OVERRIDE (IP): Performed by: HOSPITALIST

## 2023-01-28 PROCEDURE — 88304 TISSUE EXAM BY PATHOLOGIST: CPT

## 2023-01-28 PROCEDURE — 110371 HCHG SHELL REV 272: Performed by: SURGERY

## 2023-01-28 PROCEDURE — 64488 TAP BLOCK BI INJECTION: CPT | Performed by: SURGERY

## 2023-01-28 PROCEDURE — C1758 CATHETER, URETERAL: HCPCS | Performed by: SURGERY

## 2023-01-28 PROCEDURE — 160029 HCHG SURGERY MINUTES - 1ST 30 MINS LEVEL 4: Performed by: SURGERY

## 2023-01-28 PROCEDURE — A9270 NON-COVERED ITEM OR SERVICE: HCPCS | Performed by: ANESTHESIOLOGY

## 2023-01-28 PROCEDURE — 0DBN0ZZ EXCISION OF SIGMOID COLON, OPEN APPROACH: ICD-10-PCS | Performed by: SURGERY

## 2023-01-28 PROCEDURE — 85730 THROMBOPLASTIN TIME PARTIAL: CPT

## 2023-01-28 PROCEDURE — 700101 HCHG RX REV CODE 250: Performed by: ANESTHESIOLOGY

## 2023-01-28 PROCEDURE — 64486 TAP BLOCK UNIL BY INJECTION: CPT | Mod: 50,59 | Performed by: ANESTHESIOLOGY

## 2023-01-28 PROCEDURE — A9270 NON-COVERED ITEM OR SERVICE: HCPCS

## 2023-01-28 RX ORDER — SODIUM CHLORIDE 9 MG/ML
INJECTION, SOLUTION INTRAVENOUS CONTINUOUS
Status: DISCONTINUED | OUTPATIENT
Start: 2023-01-28 | End: 2023-01-29

## 2023-01-28 RX ORDER — ACETAMINOPHEN 500 MG
1000 TABLET ORAL EVERY 4 HOURS PRN
Status: DISCONTINUED | OUTPATIENT
Start: 2023-01-28 | End: 2023-01-29

## 2023-01-28 RX ORDER — METOPROLOL TARTRATE 1 MG/ML
1 INJECTION, SOLUTION INTRAVENOUS
Status: DISCONTINUED | OUTPATIENT
Start: 2023-01-28 | End: 2023-01-28 | Stop reason: HOSPADM

## 2023-01-28 RX ORDER — HYDROMORPHONE HYDROCHLORIDE 1 MG/ML
0.2 INJECTION, SOLUTION INTRAMUSCULAR; INTRAVENOUS; SUBCUTANEOUS
Status: DISCONTINUED | OUTPATIENT
Start: 2023-01-28 | End: 2023-01-28 | Stop reason: HOSPADM

## 2023-01-28 RX ORDER — HYDROMORPHONE HYDROCHLORIDE 1 MG/ML
0.4 INJECTION, SOLUTION INTRAMUSCULAR; INTRAVENOUS; SUBCUTANEOUS
Status: DISCONTINUED | OUTPATIENT
Start: 2023-01-28 | End: 2023-01-28 | Stop reason: HOSPADM

## 2023-01-28 RX ORDER — CEFAZOLIN SODIUM 1 G/3ML
INJECTION, POWDER, FOR SOLUTION INTRAMUSCULAR; INTRAVENOUS PRN
Status: DISCONTINUED | OUTPATIENT
Start: 2023-01-28 | End: 2023-01-28 | Stop reason: SURG

## 2023-01-28 RX ORDER — LIDOCAINE HYDROCHLORIDE 20 MG/ML
INJECTION, SOLUTION EPIDURAL; INFILTRATION; INTRACAUDAL; PERINEURAL PRN
Status: DISCONTINUED | OUTPATIENT
Start: 2023-01-28 | End: 2023-01-28 | Stop reason: SURG

## 2023-01-28 RX ORDER — PHENYLEPHRINE HYDROCHLORIDE 10 MG/ML
INJECTION, SOLUTION INTRAMUSCULAR; INTRAVENOUS; SUBCUTANEOUS PRN
Status: DISCONTINUED | OUTPATIENT
Start: 2023-01-28 | End: 2023-01-28 | Stop reason: SURG

## 2023-01-28 RX ORDER — HALOPERIDOL 5 MG/ML
1 INJECTION INTRAMUSCULAR
Status: DISCONTINUED | OUTPATIENT
Start: 2023-01-28 | End: 2023-01-28 | Stop reason: HOSPADM

## 2023-01-28 RX ORDER — HYDRALAZINE HYDROCHLORIDE 20 MG/ML
5 INJECTION INTRAMUSCULAR; INTRAVENOUS
Status: DISCONTINUED | OUTPATIENT
Start: 2023-01-28 | End: 2023-01-28 | Stop reason: HOSPADM

## 2023-01-28 RX ORDER — MIDAZOLAM HYDROCHLORIDE 1 MG/ML
INJECTION INTRAMUSCULAR; INTRAVENOUS PRN
Status: DISCONTINUED | OUTPATIENT
Start: 2023-01-28 | End: 2023-01-28 | Stop reason: SURG

## 2023-01-28 RX ORDER — SUCCINYLCHOLINE CHLORIDE 20 MG/ML
INJECTION INTRAMUSCULAR; INTRAVENOUS PRN
Status: DISCONTINUED | OUTPATIENT
Start: 2023-01-28 | End: 2023-01-28 | Stop reason: SURG

## 2023-01-28 RX ORDER — ALBUTEROL SULFATE 2.5 MG/3ML
2.5 SOLUTION RESPIRATORY (INHALATION)
Status: DISCONTINUED | OUTPATIENT
Start: 2023-01-28 | End: 2023-01-28 | Stop reason: HOSPADM

## 2023-01-28 RX ORDER — SODIUM CHLORIDE, SODIUM LACTATE, POTASSIUM CHLORIDE, CALCIUM CHLORIDE 600; 310; 30; 20 MG/100ML; MG/100ML; MG/100ML; MG/100ML
INJECTION, SOLUTION INTRAVENOUS CONTINUOUS
Status: DISCONTINUED | OUTPATIENT
Start: 2023-01-28 | End: 2023-01-28 | Stop reason: HOSPADM

## 2023-01-28 RX ORDER — OXYCODONE HYDROCHLORIDE 5 MG/1
5-15 TABLET ORAL
Status: DISCONTINUED | OUTPATIENT
Start: 2023-01-28 | End: 2023-02-03 | Stop reason: HOSPADM

## 2023-01-28 RX ORDER — HYDROMORPHONE HYDROCHLORIDE 2 MG/ML
INJECTION, SOLUTION INTRAMUSCULAR; INTRAVENOUS; SUBCUTANEOUS PRN
Status: DISCONTINUED | OUTPATIENT
Start: 2023-01-28 | End: 2023-01-28 | Stop reason: SURG

## 2023-01-28 RX ORDER — DIPHENHYDRAMINE HYDROCHLORIDE 50 MG/ML
12.5 INJECTION INTRAMUSCULAR; INTRAVENOUS
Status: DISCONTINUED | OUTPATIENT
Start: 2023-01-28 | End: 2023-01-28 | Stop reason: HOSPADM

## 2023-01-28 RX ORDER — SODIUM CHLORIDE, SODIUM LACTATE, POTASSIUM CHLORIDE, CALCIUM CHLORIDE 600; 310; 30; 20 MG/100ML; MG/100ML; MG/100ML; MG/100ML
INJECTION, SOLUTION INTRAVENOUS
Status: DISCONTINUED | OUTPATIENT
Start: 2023-01-28 | End: 2023-01-28 | Stop reason: SURG

## 2023-01-28 RX ORDER — MORPHINE SULFATE 4 MG/ML
2 INJECTION INTRAVENOUS
Status: DISCONTINUED | OUTPATIENT
Start: 2023-01-28 | End: 2023-02-02

## 2023-01-28 RX ORDER — ACETAMINOPHEN 325 MG/1
650 TABLET ORAL EVERY 4 HOURS PRN
Status: DISCONTINUED | OUTPATIENT
Start: 2023-01-28 | End: 2023-01-28

## 2023-01-28 RX ORDER — HYDROMORPHONE HYDROCHLORIDE 1 MG/ML
0.1 INJECTION, SOLUTION INTRAMUSCULAR; INTRAVENOUS; SUBCUTANEOUS
Status: DISCONTINUED | OUTPATIENT
Start: 2023-01-28 | End: 2023-01-28 | Stop reason: HOSPADM

## 2023-01-28 RX ORDER — ONDANSETRON 2 MG/ML
4 INJECTION INTRAMUSCULAR; INTRAVENOUS
Status: COMPLETED | OUTPATIENT
Start: 2023-01-28 | End: 2023-01-28

## 2023-01-28 RX ORDER — MEPERIDINE HYDROCHLORIDE 25 MG/ML
6.25 INJECTION INTRAMUSCULAR; INTRAVENOUS; SUBCUTANEOUS
Status: DISCONTINUED | OUTPATIENT
Start: 2023-01-28 | End: 2023-01-28 | Stop reason: HOSPADM

## 2023-01-28 RX ORDER — OXYCODONE HCL 5 MG/5 ML
5 SOLUTION, ORAL ORAL
Status: COMPLETED | OUTPATIENT
Start: 2023-01-28 | End: 2023-01-28

## 2023-01-28 RX ORDER — OXYCODONE HCL 5 MG/5 ML
10 SOLUTION, ORAL ORAL
Status: COMPLETED | OUTPATIENT
Start: 2023-01-28 | End: 2023-01-28

## 2023-01-28 RX ADMIN — PHENYLEPHRINE HYDROCHLORIDE 100 MCG: 10 INJECTION INTRAVENOUS at 12:43

## 2023-01-28 RX ADMIN — PROPOFOL 140 MG: 10 INJECTION, EMULSION INTRAVENOUS at 10:32

## 2023-01-28 RX ADMIN — SODIUM CHLORIDE, POTASSIUM CHLORIDE, SODIUM LACTATE AND CALCIUM CHLORIDE: 600; 310; 30; 20 INJECTION, SOLUTION INTRAVENOUS at 12:42

## 2023-01-28 RX ADMIN — SODIUM CHLORIDE: 9 INJECTION, SOLUTION INTRAVENOUS at 16:48

## 2023-01-28 RX ADMIN — FENTANYL CITRATE 50 MCG: 50 INJECTION, SOLUTION INTRAMUSCULAR; INTRAVENOUS at 11:00

## 2023-01-28 RX ADMIN — ONDANSETRON HYDROCHLORIDE 4 MG: 2 SOLUTION INTRAMUSCULAR; INTRAVENOUS at 14:29

## 2023-01-28 RX ADMIN — HYDROMORPHONE HYDROCHLORIDE 0.3 MG: 2 INJECTION INTRAMUSCULAR; INTRAVENOUS; SUBCUTANEOUS at 12:59

## 2023-01-28 RX ADMIN — FENTANYL CITRATE 25 MCG: 50 INJECTION, SOLUTION INTRAMUSCULAR; INTRAVENOUS at 10:30

## 2023-01-28 RX ADMIN — MEROPENEM 500 MG: 500 INJECTION, POWDER, FOR SOLUTION INTRAVENOUS at 08:02

## 2023-01-28 RX ADMIN — LIDOCAINE HYDROCHLORIDE 50 MG: 20 INJECTION, SOLUTION EPIDURAL; INFILTRATION; INTRACAUDAL at 10:32

## 2023-01-28 RX ADMIN — ROCURONIUM BROMIDE 10 MG: 10 INJECTION, SOLUTION INTRAVENOUS at 12:12

## 2023-01-28 RX ADMIN — SUGAMMADEX 150 MG: 100 INJECTION, SOLUTION INTRAVENOUS at 13:29

## 2023-01-28 RX ADMIN — FENTANYL CITRATE 25 MCG: 50 INJECTION, SOLUTION INTRAMUSCULAR; INTRAVENOUS at 11:54

## 2023-01-28 RX ADMIN — HYDROMORPHONE HYDROCHLORIDE 0.3 MG: 2 INJECTION INTRAMUSCULAR; INTRAVENOUS; SUBCUTANEOUS at 13:21

## 2023-01-28 RX ADMIN — BUDESONIDE AND FORMOTEROL FUMARATE DIHYDRATE 2 PUFF: 160; 4.5 AEROSOL RESPIRATORY (INHALATION) at 16:48

## 2023-01-28 RX ADMIN — SODIUM CHLORIDE, POTASSIUM CHLORIDE, SODIUM LACTATE AND CALCIUM CHLORIDE: 600; 310; 30; 20 INJECTION, SOLUTION INTRAVENOUS at 10:27

## 2023-01-28 RX ADMIN — ROCURONIUM BROMIDE 10 MG: 10 INJECTION, SOLUTION INTRAVENOUS at 12:50

## 2023-01-28 RX ADMIN — FENTANYL CITRATE 50 MCG: 50 INJECTION, SOLUTION INTRAMUSCULAR; INTRAVENOUS at 13:45

## 2023-01-28 RX ADMIN — PROPOFOL 20 MG: 10 INJECTION, EMULSION INTRAVENOUS at 11:58

## 2023-01-28 RX ADMIN — POLYETHYLENE GLYCOL 3350, SODIUM SULFATE ANHYDROUS, SODIUM BICARBONATE, SODIUM CHLORIDE, POTASSIUM CHLORIDE 2 L: 236; 22.74; 6.74; 5.86; 2.97 POWDER, FOR SOLUTION ORAL at 01:57

## 2023-01-28 RX ADMIN — MEROPENEM 500 MG: 500 INJECTION, POWDER, FOR SOLUTION INTRAVENOUS at 19:21

## 2023-01-28 RX ADMIN — ROCURONIUM BROMIDE 3 MG: 10 INJECTION, SOLUTION INTRAVENOUS at 10:32

## 2023-01-28 RX ADMIN — HYDROMORPHONE HYDROCHLORIDE 0.4 MG: 2 INJECTION INTRAMUSCULAR; INTRAVENOUS; SUBCUTANEOUS at 10:56

## 2023-01-28 RX ADMIN — BUDESONIDE AND FORMOTEROL FUMARATE DIHYDRATE 2 PUFF: 160; 4.5 AEROSOL RESPIRATORY (INHALATION) at 05:38

## 2023-01-28 RX ADMIN — HYDROMORPHONE HYDROCHLORIDE 0.2 MG: 1 INJECTION, SOLUTION INTRAMUSCULAR; INTRAVENOUS; SUBCUTANEOUS at 14:40

## 2023-01-28 RX ADMIN — ROCURONIUM BROMIDE 32 MG: 10 INJECTION, SOLUTION INTRAVENOUS at 10:42

## 2023-01-28 RX ADMIN — HYDROMORPHONE HYDROCHLORIDE 0.2 MG: 1 INJECTION, SOLUTION INTRAMUSCULAR; INTRAVENOUS; SUBCUTANEOUS at 14:30

## 2023-01-28 RX ADMIN — METRONIDAZOLE 500 MG: 500 TABLET ORAL at 05:38

## 2023-01-28 RX ADMIN — Medication 5 MG: at 22:26

## 2023-01-28 RX ADMIN — FENTANYL CITRATE 50 MCG: 50 INJECTION, SOLUTION INTRAMUSCULAR; INTRAVENOUS at 14:03

## 2023-01-28 RX ADMIN — MIDAZOLAM HYDROCHLORIDE 2 MG: 1 INJECTION, SOLUTION INTRAMUSCULAR; INTRAVENOUS at 10:30

## 2023-01-28 RX ADMIN — HYDROMORPHONE HYDROCHLORIDE 0.2 MG: 1 INJECTION, SOLUTION INTRAMUSCULAR; INTRAVENOUS; SUBCUTANEOUS at 15:00

## 2023-01-28 RX ADMIN — MEROPENEM 500 MG: 500 INJECTION, POWDER, FOR SOLUTION INTRAVENOUS at 16:48

## 2023-01-28 RX ADMIN — HYDROMORPHONE HYDROCHLORIDE 0.2 MG: 1 INJECTION, SOLUTION INTRAMUSCULAR; INTRAVENOUS; SUBCUTANEOUS at 14:17

## 2023-01-28 RX ADMIN — HYDROMORPHONE HYDROCHLORIDE 0.2 MG: 1 INJECTION, SOLUTION INTRAMUSCULAR; INTRAVENOUS; SUBCUTANEOUS at 14:13

## 2023-01-28 RX ADMIN — CEFAZOLIN 2 G: 330 INJECTION, POWDER, FOR SOLUTION INTRAMUSCULAR; INTRAVENOUS at 11:27

## 2023-01-28 RX ADMIN — OXYCODONE HYDROCHLORIDE 5 MG: 5 TABLET ORAL at 19:20

## 2023-01-28 RX ADMIN — PROPOFOL 20 MG: 10 INJECTION, EMULSION INTRAVENOUS at 11:02

## 2023-01-28 RX ADMIN — ROCURONIUM BROMIDE 25 MG: 10 INJECTION, SOLUTION INTRAVENOUS at 11:49

## 2023-01-28 RX ADMIN — MEROPENEM 500 MG: 500 INJECTION, POWDER, FOR SOLUTION INTRAVENOUS at 01:55

## 2023-01-28 RX ADMIN — HYDROMORPHONE HYDROCHLORIDE 1 MG: 2 INJECTION INTRAMUSCULAR; INTRAVENOUS; SUBCUTANEOUS at 11:49

## 2023-01-28 RX ADMIN — SUCCINYLCHOLINE CHLORIDE 100 MG: 20 INJECTION, SOLUTION INTRAMUSCULAR; INTRAVENOUS; PARENTERAL at 10:32

## 2023-01-28 RX ADMIN — OXYCODONE HYDROCHLORIDE 10 MG: 5 SOLUTION ORAL at 13:47

## 2023-01-28 ASSESSMENT — ENCOUNTER SYMPTOMS
NEUROLOGICAL NEGATIVE: 1
CARDIOVASCULAR NEGATIVE: 1
FLANK PAIN: 0
PSYCHIATRIC NEGATIVE: 1
GASTROINTESTINAL NEGATIVE: 1
EYES NEGATIVE: 1
RESPIRATORY NEGATIVE: 1
CONSTITUTIONAL NEGATIVE: 1

## 2023-01-28 ASSESSMENT — PATIENT HEALTH QUESTIONNAIRE - PHQ9
SUM OF ALL RESPONSES TO PHQ9 QUESTIONS 1 AND 2: 0
1. LITTLE INTEREST OR PLEASURE IN DOING THINGS: NOT AT ALL
2. FEELING DOWN, DEPRESSED, IRRITABLE, OR HOPELESS: NOT AT ALL

## 2023-01-28 ASSESSMENT — PAIN DESCRIPTION - PAIN TYPE
TYPE: ACUTE PAIN
TYPE: SURGICAL PAIN;ACUTE PAIN

## 2023-01-28 ASSESSMENT — FIBROSIS 4 INDEX: FIB4 SCORE: 1.3

## 2023-01-28 NOTE — H&P
"[facility] Consult/H&P Note      Patient's Name/MRN: Jason Yip, [id number]    Admit Date:1/20/2023  Today's Date: 1/28/2023   Length of stay:  LOS: 7 days   Room #: S146/00      Reason for consult/chief complaint: colovesical fistla  ID/HPI: Jason Yip is a 62 y.o. male patient with abdominal pain and CT findings concerning for diverticulitis and colovesical fistula. General surgery plan on sigmoid resection and requests cystoscopy with bilateral temporary stent placement. Challenging to obtain urologic history from the patient. Reports urinary urgency.     Per general surgery consult note,   \"This is a patient from [facility], who has been having abdominal pain for several weeks.  In [place] he had a course of oral antibiotics, but presented back to his local emergency department last weekend due to pain.  He was found to have multiple scalp abscesses and phlegmon surrounding his colon.  He was also noted to have a colovesicular fistula.  He was unable to be treated by the surgeon there and so was transferred to [facility] last week.  He received several days of IV antibiotics and was improving and the plan was for him to discharge home and return for a elective 1 stage operation.  However apparently his transport dropped him off somewhere else in [place].  He presented to [place] for help and was discharged to the shelter.  Last night he began having severe pain and so presented to the emergency department here for help.  He has a low grade leukocytosis, no fevers.  His CT continues to show some inflammation and fluid collections around his colon, though from the reads it sounds like it has improved from last week when he was [facility].  He is feeling much better this morning.  He tells me he was not having any pain when eating the last few weeks.  He was seen by Dr. Leong last week who recommended several weeks of antibiotics to allow the inflammation to " settle down, to allow a minimally invasive 1 stage operation.          Past Medical History:   Past Medical History:   Diagnosis Date    Chronic obstructive pulmonary disease (HCC)         Past Surgical History:   History reviewed. No pertinent surgical history.     Family History:   History reviewed. No pertinent family history.      Social History:   Social History     Tobacco Use    Smoking status: Every Day     Packs/day: 0.50     Types: Cigarettes   Substance Use Topics    Alcohol use: Yes     Alcohol/week: 72.0 oz     Types: 120 Standard drinks or equivalent per week      Social History     Social History Narrative    Not on file        Allergies: he Keflex    Medications:   No medications prior to admission.         Review of Systems  Review of Systems   Constitutional: Negative.    HENT: Negative.     Eyes: Negative.    Respiratory: Negative.     Cardiovascular: Negative.    Gastrointestinal: Negative.    Genitourinary:  Positive for dysuria, frequency and urgency. Negative for flank pain.   Skin: Negative.    Neurological: Negative.    Endo/Heme/Allergies: Negative.    Psychiatric/Behavioral: Negative.        Physical Exam  VITAL SIGNS: /84   Pulse 86   Temp 37.1 °C (98.7 °F) (Temporal)   Resp 18   Ht 1.829 m (6')   Wt 74.4 kg (164 lb 0.4 oz)   SpO2 94%   BMI 22.25 kg/m²   Physical Exam  Vitals reviewed.   Constitutional:       Comments: Multiple tattoos, disheveled, fairly challenging to answer questions appropriately and obtain history.   HENT:      Head: Normocephalic and atraumatic.   Eyes:      Conjunctiva/sclera: Conjunctivae normal.      Pupils: Pupils are equal, round, and reactive to light.   Cardiovascular:      Rate and Rhythm: Normal rate and regular rhythm.   Pulmonary:      Effort: Pulmonary effort is normal.   Abdominal:      Palpations: Abdomen is soft.   Musculoskeletal:         General: No tenderness.      Cervical back: Normal range of motion and neck supple.   Skin:      General: Skin is warm and dry.   Neurological:      Mental Status: He is alert and oriented to person, place, and time.   Psychiatric:         Mood and Affect: Mood and affect normal.         Cognition and Memory: Memory normal.         Judgment: Judgment normal.         Labs:  Recent Labs     01/26/23  0142 01/27/23  0054 01/28/23  0752   WBC 12.4* 13.7* 11.5*   RBC 5.22 5.26 5.15   HEMOGLOBIN 16.3 16.3 15.9   HEMATOCRIT 48.5 49.0 46.7   MCV 92.9 93.2 90.7   MCH 31.2 31.0 30.9   MCHC 33.6* 33.3* 34.0   RDW 43.1 42.8 41.8   PLATELETCT 386 352 309   MPV 8.8* 8.9* 8.6*             Glucose:  No results for input(s): GLUCOSE in the last 72 hours.  Coags:  No results for input(s): INR in the last 72 hours.      Urinalysis:   No results for input(s): COLORURINE, CLARITY, SPECGRAVITY, PHURINE, GLUCOSEUR, KETONES, NITRITE, OCCULTBLOOD, RBCURINE, BACTERIA, EPITHELCELL in the last 72 hours.    Invalid input(s): BILRUBINUR, LEUESTERASE    Imaging:  CT-ABDOMEN-PELVIS WITH   Final Result      1.  Redemonstrated findings of sigmoid diverticulitis with tiny extraluminal air suggesting localized perforation and some adjacent phlegmon. There is no drainable abscess.   2.  Redemonstrated is a small colovesicular fistula involving the bladder dome with associated wall thickening and persistent air within the urinary bladder.   3.  Changes of chronic liver disease/cirrhosis and mild splenomegaly.   4.  Additional stable findings as detailed.      CT-ABDOMEN-PELVIS WITH   Final Result         1.  Changes of sigmoid diverticulitis, small collection of fluid with focus of air adjacent to sigmoid colon is seen compatible with microperforation and small diverticular abscess.   2.  Colovesicular fistula is seen with air in the bladder.   3.  Irregular hepatic contour favoring changes of cirrhosis   4.  Segment 2 duodenal diverticula   5.  Small fat-containing right inguinal hernia   6.  Left nephrolithiasis   7.  Cholelithiasis   8.   Atherosclerosis      These findings were discussed with the patient's clinician, Kenji Coronel, on 1/21/2023 12:22 AM.          I personally reviewed his CT scan imaging  agree there is likely colovesical fistula near the dome of the bladder.  I do not appreciate any hydronephrosis     Assessment/Recommendation   This is a 62-year-old male with history of diverticulitis and likely diverticular related colovesical fistula from the sigmoid colon.  General surgery is planning on sigmoid resection plus or minus diverting ileostomy.  To aid in identification of bilateral ureters the surgeon requested bilateral temporary ureteral stents.  I discussed cystoscopy with bilateral ureteral stents with the patient in the preoperative holding area.  We discussed the risks of hematuria, stent discomfort, possible but unlikely injury to the collecting system and the patient signed consent day of procedure.  Urology will likely sign off if the temporary ureteral stents are removed at the conclusion of the procedure.  Catheter drainage would be recommended for minimum 7 days and can be managed by surgeon.  If there are any concerns for ureteral injury or complex bladder closure needed urology will be available.    Joseph Taylor MD  [facility]

## 2023-01-28 NOTE — PROGRESS NOTES
[facility] Internal Medicine Daily Progress Note    Date of Service  1/28/2023    [facility] Team: [facility] IM White Team   Attending: Joni Nieves M.d.  Senior Resident: Dr. De Los Santos  Intern:  Dr. Noe   Contact Number: [phone]    Chief Complaint  Jason Yip is a 62 y.o. male admitted 1/20/2023 with abdominal pain.     Hospital Course  Jason Yip is a 62 y.o. male who presented 1/20/2023 abdominal pain.   He has a past medical history of cirrhosis (seen on imaging) COPD and polysubstance use (however patient states that he quit several weeks ago). Patient initially presented to [facility] in [facility] on 1/4/2023 reporting 2 months of difficulty with urination. On initial presentation he was found to be septic with a leukocytosis of over 14,000.  At the time CT abdomen pelvis revealed severe sigmoid diverticulitis with inflammatory phlegmon formation and possible colovesicular fistula. At that time he was treated with ertapenem, Flagyl, and also treated for alcohol withdrawal.  Surgery was consulted and recommended no surgical intervention at the time.   He later presented to [facility] again on 1/15/2023 for evaluation of lower abdominal pain and bubbles in his urine.  Urine cultures at the time revealed resistance to ciprofloxacin.  Repeat CT revealed sigmoid colovesicular fistula and patient was transferred to [facility] for higher level of care on 1/17/2023.  (Records not available from [facility]) Per patient while he was at [facility] he was told that he required IV antibiotics and surgical intervention on outpatient basis after antibiotic treatment for 2 weeks and was discharged on 1/19/2023. Patient states that he was discharged on p.o. Augmentin with a Taxi voucher that took him somewhere in [place] rather than back to [place] and he has been homeless since then. He presented to [facility] and was discharged back to the  shelter 1/19. He was brought by ambulance from [facility] the [facility] on 1/20 for continued abd pain.     While admitted here general surgery was consulted and recommended liquid diet, broad spectrum abx and plan for possible sigmoid resection by Dr. Leong. Urine culture from admission was positive for ESBL e coli on 1/23 so patient was started on Meropenem.     Interval Problem Update  This morning Mr Yip said he was doing well but did not like bowel prep yesterday. His sister was concerned about any possible complications and wanted her information confirmed in the chart.   -Surgery planned today 10am     I have discussed this patient's plan of care and discharge plan at IDT rounds today with Case Management, Nursing, Nursing leadership, and other members of the IDT team.    Consultants/Specialty  general surgery    Code Status  Full Code    Disposition  Patient is not medically cleared for discharge.   Anticipate discharge to to home with close outpatient follow-up.  I have placed the appropriate orders for post-discharge needs.    Review of Systems  Review of Systems   Constitutional:  Negative for chills, fever and malaise/fatigue.   HENT:  Negative for congestion and sore throat.    Respiratory:  Negative for cough, sputum production, shortness of breath.   Cardiovascular:  Negative for chest pain, palpitations and orthopnea.   Gastrointestinal:  Negative for abdominal pain, constipation, heartburn, melena, nausea and vomiting. Positive for diarrhea.   Genitourinary:  Negative for dysuria, flank pain, frequency, hematuria and urgency. Positive for pneumaturia.   Musculoskeletal:  Negative for myalgias, back pain and neck pain.   Neurological:  Negative for dizziness, tremors, weakness and headaches.       Physical Exam  Temp:  [36.1 °C (97 °F)-37.1 °C (98.7 °F)] 36.2 °C (97.2 °F)  Pulse:  [72-86] 86  Resp:  [16-19] 18  BP: (102-129)/(74-95) 129/95  SpO2:  [92 %-96 %] 93 %    Physical Exam  Vitals and nursing note  reviewed.   Constitutional:       General: He is not in acute distress.     Appearance: Normal appearance. He is normal weight. He is not ill-appearing or toxic-appearing.    Cardiovascular:      Rate and Rhythm: Normal rate and regular rhythm.      Pulses: Normal pulses.      Heart sounds: Normal heart sounds. No murmur heard.    No friction rub. No gallop.   Pulmonary:      Effort: Pulmonary effort is normal. No respiratory distress.      Breath sounds: Normal breath sounds. No stridor. No wheezing, rhonchi or rales.   Abdominal:      General: Abdomen is flat. Bowel sounds are normal. There is no distension.      Palpations: Abdomen is soft.      Tenderness: There is no abdominal tenderness. There is no guarding or rebound.   Neurological:      Mental Status: He is alert and oriented to person, place, and time.     Fluids    Intake/Output Summary (Last 24 hours) at 1/28/2023 1258  Last data filed at 1/27/2023 2300  Gross per 24 hour   Intake 1120 ml   Output --   Net 1120 ml       Laboratory  Recent Labs     01/26/23  0142 01/27/23  0054 01/28/23  0752   WBC 12.4* 13.7* 11.5*   RBC 5.22 5.26 5.15   HEMOGLOBIN 16.3 16.3 15.9   HEMATOCRIT 48.5 49.0 46.7   MCV 92.9 93.2 90.7   MCH 31.2 31.0 30.9   MCHC 33.6* 33.3* 34.0   RDW 43.1 42.8 41.8   PLATELETCT 386 352 309   MPV 8.8* 8.9* 8.6*     Recent Labs     01/28/23  0752   SODIUM 131*   POTASSIUM 4.4   CHLORIDE 97   CO2 24   GLUCOSE 110*   BUN 11   CREATININE 0.67   CALCIUM 9.2     Recent Labs     01/28/23  0752   APTT 34.1   INR 1.04               Imaging  CT-ABDOMEN-PELVIS WITH    Result Date: 1/27/2023  1.  Redemonstrated findings of sigmoid diverticulitis with tiny extraluminal air suggesting localized perforation and some adjacent phlegmon. There is no drainable abscess. 2.  Redemonstrated is a small colovesicular fistula involving the bladder dome with associated wall thickening and persistent air within the urinary bladder. 3.  Changes of chronic liver  disease/cirrhosis and mild splenomegaly. 4.  Additional stable findings as detailed.    CT-ABDOMEN-PELVIS WITH    Result Date: 1/21/2023  1.  Changes of sigmoid diverticulitis, small collection of fluid with focus of air adjacent to sigmoid colon is seen compatible with microperforation and small diverticular abscess. 2.  Colovesicular fistula is seen with air in the bladder. 3.  Irregular hepatic contour favoring changes of cirrhosis 4.  Segment 2 duodenal diverticula 5.  Small fat-containing right inguinal hernia 6.  Left nephrolithiasis 7.  Cholelithiasis 8.  Atherosclerosis These findings were discussed with the patient's clinician, Kenji Coronel, on 1/21/2023 12:22 AM.    CT-RENAL COLIC EVALUATION(A/P W/O)    Result Date: 1/4/2023  IMPRESSION: 1. Severe sigmoid diverticulosis with severe diverticulitis with inflammatory phlegmon along the right with inflammatory changes extending to the dome of the bladder and 2 adjacent loop of small bowel. No drainable abscess is present. 2. These findings were discussed with Dr. Gore shortly following completion of the study.    Electronically signed by: Carlos Taylor 1/4/2023 11:39 AM       [id number] VIEW    Result Date: 1/15/2023  IMPRESSION: 1. Mild adynamic ileus.       Electronically signed by: Carlos Taylor 1/15/2023 12:52 PM       DX-CHEST-PORTABLE (1 VIEW)    Result Date: 1/15/2023  IMPRESSION: 1. No evidence of acute process in the chest.    Electronically signed by: Carlos Taylor 1/15/2023 12:50 PM       CT ABDOMEN-PELVIS WITH IV CONTRAST    Result Date: 1/15/2023  IMPRESSION: 1. Severe sigmoid diverticulitis with probable multiple small abscesses in the surrounding tissues. No large drainable abscess is seen. The inflammatory changes extend to the bladder. Air in the bladder is consistent with a sigmoid colon bladder fistula. 2. Tiny gallstone without evidence of acute cholecystitis or bile duct dilatation. 3. Splenomegaly without focal  abnormality.    Electronically signed by: Carlos Taylor 1/15/2023 4:25 PM       CT-CTA CHEST PULMONARY ARTERY W/ RECONS    Result Date: 1/16/2023  IMPRESSION: 1. No evidence of pulmonary emboli. 2. New subsegmental atelectasis right lower lobe.    Electronically signed by: Carlos Taylor 1/16/2023 12:15 PM           Assessment/Plan  Problem Representation:    * Colovesical fistula    Assessment & Plan  -Seen on imaging on 1/17 and again on 1/21. Dx confirmed by air in bladder and by sx of pneumaturia.   -Per Dr. Leong who evaluated patient at [facility] 1/17, recommended 2 weeks of antibiotics prior to proceeding with any surgery.   -Prior cultures revealed resistance to ciprofloxacin.   -Repeat urine culture ESBL E Coli.   Plan:   -Continue Meropenem (started 1/23)   -surgery 1/28      Complicated Diverticulitis- (present on admission)  Assessment & Plan  -Complicated by multiple phlegmon and possible colovesicular fistula (seen on CT 1/21)  -Repeat CT 1/27 noted extraluminal air around sigmoid diverticulitis suggesting local perforation.   Plan:   -Pain control with PRN tylenol   -IV meropenem   -surgery 1/28      Hyponatremia  Assessment & Plan  -Serum Osm 284 (WNL) indicating isotonic hyponatremia. Ddx hyperproteinuria or hyperlipidemia.   -128 on admission. Improving to 130s with PO intake.   -Pt has recurrent myalgias, could be sx of hyponatremia.  -Uosm and Amanda elevated.   -Lipid panel 6/6/22 WNL  -UA 1/20 notable for proteinuria.   Plan:   -can consider ordering urine protein creatinine ratio if hyponatremia worsens.      Bacteremia (?)  Assessment & Plan  -Per outside hospital records blood cultures positive for gram positive cocci  -Repeat blood cultures negative.   -Requested [facility] records      COPD (chronic obstructive pulmonary disease) (HCC)  Assessment & Plan  -No PFTs on file  -Chest x-ray clear on admission, no wheezing on exam  -Symbicort daily  -As needed albuterol      Alcoholic cirrhosis of liver without ascites (HCC)  Assessment & Plan  -Cirrhotic changes seen on imaging  -Patient is mildly distended on exam however no clear signs of ascites.  No hepatic encephalopathy at this time.      History of methamphetamine use  Assessment & Plan  -Patient denies any use in the past several weeks  -Encourage continued sobriety     Nephrolithiasis  Assessment & Plan  -Nonobstructing nephrolithiasis, incidental finding on imaging.  -Continue to monitor renal function and urine output     Cholelithiasis  Assessment & Plan  -Asymptomatic  -Outpatient follow up      VTE prophylaxis: SCDs/TEDs    I have performed a physical exam and reviewed and updated ROS and Plan today (1/28/2023). In review of yesterday's note (1/27/2023), there are no changes except as documented above.

## 2023-01-28 NOTE — OP REPORT
Pre-operative Diagnosis: 1. Sigmoid diverticulitis  2. Need for ureteral identification during colo-rectal surgery  3. Colovesical fistula   Post-operative Diagnosis: Same as above   Procedure: 1. Cystoscopy  2.  Placement of temporary ureteral stents, bilateral  3.  Urethral dilation of stricture, sequential   Surgeon Joseph Taylor M.D.   Assistant: None   Anesthesia: LMA, General,    Estimated Blood Loss: minimal         Specimens: 1.  None   Drains:  Bilateral 6 Ivorian open-ended ureteral catheters acting as temporary ureteral stents  18F bullard catheter with 10cc in balloon   Wound class II clean contaminated   Condition and complications Stable, procedure well tolerated without complications   Disposition:  PACU, general surgery to manage ureteral catheters and Bullard catheter, ureteral stents likely to be removed at the conclusion of the procedure.  Urology to sign off unless further management is required.   Findings: 1. Cystoscopy findings: Small nonobstructing prostate, fossa navicularis stricture estimated 16 Ivorian requiring sequential dilation to 22 Ivorian.  No other urethral strictures present.  Bilateral ureters in normal anatomic location.  Colovesical fistula with surrounding edema identified posterior bladder near the dome.  No other masses appreciated.  Moderate debris within the bladder that needed to be irrigated.  Ureteral stents easily placed with 0 resistance bilaterally over a sensor tip wire      Indications for Procedure:  This patient is a *62-year-old male with sigmoid diverticulitis and colovesical fistula based on CT scan who is planning on sigmoid resection with general surgery. They are here for definitive surgery and general surgery requested temporary ureteral stents for identification of ureters.      Risks discussed, but not limited to, were infection, sepsis, bleeding, bladder injury, need for secondary procedure, possible need for bullard post op, possible admission post  operatively, and the cardiovascular and pulmonary complications of anesthesia/surgery including DVT, Mi, PE, and death.      Procedure in Detail:  Patient was explained the risks and benefits of the procedure including bleeding, infection, bladder ruptured, pain, hematuria, and elects to proceed. In the lithotomy position, They were prepped and draped in the usual sterile fashion, given a gram of intravenous antibiotics. A 22-Comoran rigid cystoscope with 30-degree  lens was introduced per urethra sterilely.  Please see above findings.     Pancystoscopy was performed and there were no lesions.     Sequentially dilated the fossa navicularis stricture with Marcella sounds between 16 and 22 Comoran.  Despite this there is significant resistance with our 20 Comoran rigid cystoscope.    The ureteral orifices were in normal position and orientation with clear urine from each side.      Will place a 6 Comoran open-ended ureteral catheter up the right ureter with 0 resistance.  I backloaded off the scope.  I then repeated the procedure for the left side.    I attempted to place 18 Comoran Martin catheter but due to the pre-existing urethral stricture and the 2 ureteral catheters in the urethra this was very challenging.  This eventually needed to use a catheter guide within the catheter just to get the catheter into the bladder.  10 cc of water was placed into the balloon.  I backloaded the 6 Comoran ureteral catheters through wide portion of the Martin catheter and then guided these stents into the down drain bag and attached the down drain bag.    Blood loss was zero. Final sponge and instrument counts correct x2. The patient tolerated the procedure well.     The case was then turned over to general surgery.     Urology to sign off.     Joseph Taylor MD  [facility]  [id number] [id number]

## 2023-01-28 NOTE — ANESTHESIA TIME REPORT
Anesthesia Start and Stop Event Times     Date Time Event    1/28/2023 1020 Ready for Procedure     1027 Anesthesia Start     1339 Anesthesia Stop        Responsible Staff  01/28/23    Name Role Begin End    Jason Bucio M.D. Anesth 1027 1339        Overtime Reason:  no overtime (within assigned shift)    Comments:

## 2023-01-28 NOTE — ANESTHESIA PREPROCEDURE EVALUATION
Case: [id number] Date/Time: 01/28/23 0945    Procedures:       COLECTOMY, SIGMOID- COLOVESICAL FISTULA REPAIR      CYSTOSCOPY    Location: [facility] OR 07 / SURGERY [facility]    Surgeons: Pb Sparrow M.D.; Joseph Taylor M.D.        Meth user- says not in 3 weeks, but regular  Relevant Problems   PULMONARY   (positive) COPD (chronic obstructive pulmonary disease) (HCC)         (positive) Alcoholic cirrhosis of liver without ascites (HCC)   (positive) Nephrolithiasis       Physical Exam    Airway   Mallampati: II  TM distance: >3 FB  Neck ROM: full       Cardiovascular - normal exam  Rhythm: regular  Rate: normal  (-) murmur     Dental - normal exam    Unable to assess dental  Very poor dentition  Facial Hair   Pulmonary - normal exam  Breath sounds clear to auscultation     Abdominal    Neurological - normal exam         Other findings: No teeth, full beard            Anesthesia Plan    ASA 3- EMERGENT   ASA physical status 3 criteria: alcohol and/or substance dependence or abuse and COPDASA physical status emergent criteria: acutely contaminated wound or identified infection source    Plan - general       Airway plan will be ETT          Induction: intravenous    Postoperative Plan: Postoperative administration of opioids is intended.    Pertinent diagnostic labs and testing reviewed    Informed Consent:    Anesthetic plan and risks discussed with patient.    Use of blood products discussed with: patient whom consented to blood products.

## 2023-01-28 NOTE — ANESTHESIA POSTPROCEDURE EVALUATION
Patient: Jason Yip    Procedure Summary     Date: 01/28/23 Room / Location: [facility] 07 / SURGERY [facility]    Anesthesia Start: 1027 Anesthesia Stop: 1339    Procedures:       COLECTOMY, SIGMOID- COLOVESICAL FISTULA REPAIR (Abdomen)      CYSTOSCOPY, BILATERALSTENT PLACEMENT, URETHERAL DILATATION (Bilateral: Ureter) Diagnosis: (colovesical fistula)    Surgeons: Pb Sparrow M.D.; Joseph Taylor M.D. Responsible Provider: Jason Bucio M.D.    Anesthesia Type: general ASA Status: 3 - Emergent          Final Anesthesia Type: general  Last vitals  BP   Blood Pressure: (!) 153/92    Temp   36.3 °C (97.3 °F)    Pulse   (!) 107   Resp   14    SpO2   91 %      Anesthesia Post Evaluation    Patient location during evaluation: PACU  Patient participation: complete - patient participated  Level of consciousness: awake and alert    Airway patency: patent  Anesthetic complications: no  Cardiovascular status: hemodynamically stable  Respiratory status: acceptable  Hydration status: euvolemic    PONV: none          No notable events documented.     Nurse Pain Score: 5 (NPRS)

## 2023-01-28 NOTE — PROGRESS NOTES
Received report from STEVE Mir. Pt arrived with belongings and golytely prep. Pt was informed on the process of drinking golytely and the meds that are needed to be taken prior to the morning procedure.

## 2023-01-28 NOTE — ANESTHESIA PROCEDURE NOTES
Airway    Date/Time: 1/28/2023 10:33 AM  Performed by: Jason Bucio M.D.  Authorized by: Jaosn Bucio M.D.     Location:  OR  Urgency:  Elective  Indications for Airway Management:  Anesthesia      Spontaneous Ventilation: absent    Sedation Level:  Deep  Preoxygenated: Yes    Patient Position:  Sniffing  Final Airway Type:  Endotracheal airway  Final Endotracheal Airway:  ETT  Cuffed: Yes    Technique Used for Successful ETT Placement:  Direct laryngoscopy    Insertion Site:  Oral  Blade Type:  Bautista  Laryngoscope Blade/Videolaryngoscope Blade Size:  2  ETT Size (mm):  8.0  Measured from:  Teeth  ETT to Teeth (cm):  22  Placement Verified by: auscultation and capnometry    Cormack-Lehane Classification:  Grade I - full view of glottis  Number of Attempts at Approach:  1

## 2023-01-28 NOTE — OP REPORT
Surgeon: Pb Sparrow MD  Assist: Lori Howe MD  Preoperative diagnosis: Colovesical fistula secondary to diverticulitis  Postoperative diagnosis: Colovesical fistula secondary to diverticulitis  Procedure: Sigmoid colon resection with primary anastomosis and takedown of colovesical fistula, mobilization of the splenic flexure  Anesthesia: General endotracheal anesthesia  Anesthesiologist: Jason Bucio MD  Indications: 62-year-old man with findings consistent with a colovesical fistula secondary to chronic diverticulitis.  He has been treated with antibiotics for about 1 week and an operation after bowel prep is now indicated.  Narrative: The procedure was discussed in detail with the patient including the risks of bleeding, infection, abscess, and hematoma.  I also discussed risk of anastomotic leak, anastomotic stricture and the potential for requiring a colostomy.  I also discussed the risk of injury to intraperitoneal organs such as the small bowel and retroperitoneal organs such as the ureter.  He understood all the above and wished to proceed.  He was placed under anesthesia by Dr. Bucio.  He had a ureteral stents placed by Dr. Joseph Taylor and this will be dictated separately.  After this his abdomen was prepped and draped with chlorhexidine prep and draped sterilely.  He was in a low lithotomy position.  A midline incision was made and through this incision the peritoneal cavity was entered.  Exploration did reveal an inflammatory mass at the pelvic inlet with clinically and associated colovesical fistula.  The colon proximal to the mass was mobilized and divided without difficulty.  With the aid of the ligature device and the mesentery and the surrounding inflammatory tissue were divided to mobilize the distal sigmoid colon and the rectum.  The ureters were identified during this dissection with the aid of the stents and protected.  The colovesical fistula was taken down.  Eventually dissection  continued to normal rectum.  The proximal rectum was divided at this point with a contour stapler.  The splenic flexure was then taken down to allow for a tension-free anastomosis.  The distal colon was divided and sized.  The colon was noted to be appropriate for a 29 EEA.  A 2-0 Prolene pursestring was placed.  The anvil of the EEA was secured in place.  The EEA was then passed from below and deployed without difficulty.  An anastomosis was then created using the EEA stapler.  The anastomosis was noted to be tension-free and well perfused.  The distal colon was insufflated with a sigmoidoscope and no leakage of air was noted consistent with an airtight anastomosis.  Hemostasis was assured.  The fascia was then approximated with a running PDS suture.  The skin was stapled closed.  The patient tolerated procedure well without apparent complication.  The final counts were reported as correct.  Wound class was class III.    The assistant, Dr. Howe, was necessary due to the complexity of the operation.  She assisted with exposure and retraction.  Dr. Howe also assisted with closure of the incision.

## 2023-01-28 NOTE — ANESTHESIA PROCEDURE NOTES
Peripheral Block    Date/Time: 1/28/2023 1:17 PM  Performed by: Jason Bucio M.D.  Authorized by: Jason Bucio M.D.     Start Time:  1/28/2023 1:17 PM  End Time:  1/28/2023 1:25 PM  Reason for Block: at surgeon's request and post-op pain management ONLY    patient identified, IV checked, site marked, risks and benefits discussed, surgical consent, monitors and equipment checked, pre-op evaluation and timeout performed    Patient Position:  Supine  Prep: ChloraPrep    Monitoring:  Heart rate, continuous pulse ox and cardiac monitor  Block Region:  Trunk  Trunk - Block Type:  Abdominal plane block - TAP block    Laterality:  Bilateral  Procedures: ultrasound guided  Image captured, interpreted and electronically stored.  Local Infiltration:  Lidocaine  Strength:  1 %  Dose:  3 ml  Block Type:  Single-shot  Needle Length:  100mm  Needle Gauge:  21 G  Needle Localization:  Ultrasound guidance  Injection Assessment:  Negative aspiration for heme, no paresthesia on injection, incremental injection and local visualized surrounding nerve on ultrasound   US Guided Transversus Abdominis Plane (TAP) Block   US probe placed at the anterior axillary line between the costal margin and iliac crest in an axial plane. External Oblique, Internal Oblique (IO) and Transversis Abdominis (TA) muscles identified.  Needle inserted anteromedial to probe in an in plane approach and advanced under direct visualization to TAP between IO and TA muscled.  After negative aspiration LA injected with ease and visualized spreading in TAP plane.

## 2023-01-28 NOTE — CARE PLAN
Problem: Pain - Standard  Goal: Alleviation of pain or a reduction in pain to the patient’s comfort goal  Outcome: Progressing     Problem: Knowledge Deficit - Standard  Goal: Patient and family/care givers will demonstrate understanding of plan of care, disease process/condition, diagnostic tests and medications  Outcome: Progressing   The patient is Stable - Low risk of patient condition declining or worsening    Shift Goals  Clinical Goals: finish bowel prep as ordered  Patient Goals: dc home  Family Goals: celestina

## 2023-01-29 PROCEDURE — 700105 HCHG RX REV CODE 258: Performed by: HOSPITALIST

## 2023-01-29 PROCEDURE — A9270 NON-COVERED ITEM OR SERVICE: HCPCS

## 2023-01-29 PROCEDURE — 99233 SBSQ HOSP IP/OBS HIGH 50: CPT | Mod: GC | Performed by: HOSPITALIST

## 2023-01-29 PROCEDURE — 700102 HCHG RX REV CODE 250 W/ 637 OVERRIDE(OP): Performed by: SURGERY

## 2023-01-29 PROCEDURE — 770001 HCHG ROOM/CARE - MED/SURG/GYN PRIV*

## 2023-01-29 PROCEDURE — A9270 NON-COVERED ITEM OR SERVICE: HCPCS | Performed by: SURGERY

## 2023-01-29 PROCEDURE — 700102 HCHG RX REV CODE 250 W/ 637 OVERRIDE(OP)

## 2023-01-29 PROCEDURE — 700111 HCHG RX REV CODE 636 W/ 250 OVERRIDE (IP)

## 2023-01-29 PROCEDURE — 700111 HCHG RX REV CODE 636 W/ 250 OVERRIDE (IP): Performed by: HOSPITALIST

## 2023-01-29 PROCEDURE — 99024 POSTOP FOLLOW-UP VISIT: CPT | Performed by: SURGERY

## 2023-01-29 RX ORDER — ACETAMINOPHEN 500 MG
1000 TABLET ORAL EVERY 8 HOURS
Status: DISCONTINUED | OUTPATIENT
Start: 2023-01-29 | End: 2023-02-03 | Stop reason: HOSPADM

## 2023-01-29 RX ORDER — ENOXAPARIN SODIUM 100 MG/ML
40 INJECTION SUBCUTANEOUS DAILY
Status: DISCONTINUED | OUTPATIENT
Start: 2023-01-29 | End: 2023-02-03 | Stop reason: HOSPADM

## 2023-01-29 RX ORDER — AMOXICILLIN 250 MG
2 CAPSULE ORAL DAILY
Status: DISCONTINUED | OUTPATIENT
Start: 2023-01-29 | End: 2023-01-29

## 2023-01-29 RX ORDER — POLYETHYLENE GLYCOL 3350 17 G/17G
1 POWDER, FOR SOLUTION ORAL DAILY
Status: DISCONTINUED | OUTPATIENT
Start: 2023-01-29 | End: 2023-01-29

## 2023-01-29 RX ADMIN — BUDESONIDE AND FORMOTEROL FUMARATE DIHYDRATE 2 PUFF: 160; 4.5 AEROSOL RESPIRATORY (INHALATION) at 04:43

## 2023-01-29 RX ADMIN — OXYCODONE HYDROCHLORIDE 15 MG: 5 TABLET ORAL at 08:20

## 2023-01-29 RX ADMIN — ENOXAPARIN SODIUM 40 MG: 40 INJECTION SUBCUTANEOUS at 17:08

## 2023-01-29 RX ADMIN — MEROPENEM 500 MG: 500 INJECTION, POWDER, FOR SOLUTION INTRAVENOUS at 01:21

## 2023-01-29 RX ADMIN — MEROPENEM 500 MG: 500 INJECTION, POWDER, FOR SOLUTION INTRAVENOUS at 22:44

## 2023-01-29 RX ADMIN — MORPHINE SULFATE 2 MG: 4 INJECTION INTRAVENOUS at 23:08

## 2023-01-29 RX ADMIN — MEROPENEM 500 MG: 500 INJECTION, POWDER, FOR SOLUTION INTRAVENOUS at 10:05

## 2023-01-29 RX ADMIN — BUDESONIDE AND FORMOTEROL FUMARATE DIHYDRATE 2 PUFF: 160; 4.5 AEROSOL RESPIRATORY (INHALATION) at 17:09

## 2023-01-29 RX ADMIN — OXYCODONE HYDROCHLORIDE 15 MG: 5 TABLET ORAL at 17:01

## 2023-01-29 RX ADMIN — MORPHINE SULFATE 2 MG: 4 INJECTION INTRAVENOUS at 12:45

## 2023-01-29 RX ADMIN — MEROPENEM 500 MG: 500 INJECTION, POWDER, FOR SOLUTION INTRAVENOUS at 17:19

## 2023-01-29 RX ADMIN — OXYCODONE HYDROCHLORIDE 15 MG: 5 TABLET ORAL at 21:17

## 2023-01-29 RX ADMIN — MORPHINE SULFATE 2 MG: 4 INJECTION INTRAVENOUS at 18:36

## 2023-01-29 RX ADMIN — Medication 5 MG: at 22:39

## 2023-01-29 RX ADMIN — OXYCODONE HYDROCHLORIDE 5 MG: 5 TABLET ORAL at 01:28

## 2023-01-29 ASSESSMENT — ENCOUNTER SYMPTOMS
CONSTIPATION: 0
PALPITATIONS: 0
VOMITING: 0
NAUSEA: 1
FEVER: 0
NECK PAIN: 0
BACK PAIN: 1
PND: 0
HEMOPTYSIS: 0
MYALGIAS: 1
SHORTNESS OF BREATH: 0
SPUTUM PRODUCTION: 0
WHEEZING: 0
ABDOMINAL PAIN: 1
HEADACHES: 0
BLOOD IN STOOL: 0
FLANK PAIN: 0
SORE THROAT: 0
WEAKNESS: 0
SINUS PAIN: 0
COUGH: 0
DIARRHEA: 0
DIZZINESS: 0
HEARTBURN: 0

## 2023-01-29 ASSESSMENT — COGNITIVE AND FUNCTIONAL STATUS - GENERAL
MOVING TO AND FROM BED TO CHAIR: A LITTLE
STANDING UP FROM CHAIR USING ARMS: A LITTLE
WALKING IN HOSPITAL ROOM: A LITTLE
MOBILITY SCORE: 18
CLIMB 3 TO 5 STEPS WITH RAILING: A LITTLE
TURNING FROM BACK TO SIDE WHILE IN FLAT BAD: A LITTLE
MOVING FROM LYING ON BACK TO SITTING ON SIDE OF FLAT BED: A LITTLE
SUGGESTED CMS G CODE MODIFIER MOBILITY: CK

## 2023-01-29 ASSESSMENT — PAIN SCALES - WONG BAKER: WONGBAKER_NUMERICALRESPONSE: DOESN'T HURT AT ALL

## 2023-01-29 ASSESSMENT — PATIENT HEALTH QUESTIONNAIRE - PHQ9
SUM OF ALL RESPONSES TO PHQ9 QUESTIONS 1 AND 2: 0
2. FEELING DOWN, DEPRESSED, IRRITABLE, OR HOPELESS: NOT AT ALL
1. LITTLE INTEREST OR PLEASURE IN DOING THINGS: NOT AT ALL

## 2023-01-29 ASSESSMENT — PAIN DESCRIPTION - PAIN TYPE
TYPE: SURGICAL PAIN

## 2023-01-29 NOTE — PROGRESS NOTES
Postoperative day #1 from sigmoid colon resection and takedown of colovesical fistula  Has some pain  Benign exam  Tolerating clears  Plan:  Advance to full's  Mobilized  Leave Martin in secondary to history of colovesical fistula

## 2023-01-29 NOTE — PROGRESS NOTES
Patient is alert and oriented and able to ambulate by himself, refusing bed alarm. Education was given regarding benefits of having bed alarm and fall safety. Pt verbalize understanding but chooses to refuse anyway.

## 2023-01-29 NOTE — CARE PLAN
Problem: Pain - Standard  Goal: Alleviation of pain or a reduction in pain to the patient’s comfort goal  Outcome: Progressing     Problem: Pain - Standard  Goal: Alleviation of pain or a reduction in pain to the patient’s comfort goal  Outcome: Progressing     Problem: Urinary Elimination:  Goal: Ability to achieve and maintain adequate renal perfusion and functioning will improve  Outcome: Progressing  Goal: Ability to achieve a balanced intake and output will improve  Outcome: Progressing     Problem: Urinary Elimination:  Goal: Ability to achieve and maintain adequate renal perfusion and functioning will improve  Outcome: Progressing   The patient is Stable - Low risk of patient condition declining or worsening    Shift Goals  Clinical Goals: Patient will maintain adequate intake and output by end of shift  Patient Goals: Go home  Family Goals: Not present, STEW    Progress made toward(s) clinical / shift goals:  patient maintained adequate intake and output    Patient is not progressing towards the following goals:

## 2023-01-29 NOTE — CARE PLAN
The patient is Stable - Low risk of patient condition declining or worsening    Shift Goals  Clinical Goals: surgery  Patient Goals: discharge  Family Goals: celestina    Progress made toward(s) clinical / shift goals:    Problem: Pain - Standard  Goal: Alleviation of pain or a reduction in pain to the patient’s comfort goal  Outcome: Progressing  Patient will be free of pain by discharge     Problem: Knowledge Deficit:  Goal: Patient's knowledge of the prescribed therapeutic regimen will improve  Outcome: Progressing   Patient will be able to explain necessity of bullard catheter by 1/29    Patient is not progressing towards the following goals:

## 2023-01-29 NOTE — PROGRESS NOTES
[facility] Internal Medicine Daily Progress Note    Date of Service  1/29/2023    [facility] Team: [facility] IM White Team   Attending: Joni Nieves M.d.  Senior Resident: Dr. De Los Santos  Intern:  Dr. Noe   Contact Number: [phone]    Chief Complaint  Jason Yip is a 62 y.o. male admitted 1/20/2023 with abdominal pain.     Hospital Course  Jason Yip is a 62 y.o. male who presented 1/20/2023 abdominal pain.   He has a past medical history of cirrhosis (seen on imaging) COPD and polysubstance use (however patient states that he quit several weeks ago). Patient initially presented to [facility] in [facility] on 1/4/2023 reporting 2 months of difficulty with urination. On initial presentation he was found to be septic with a leukocytosis of over 14,000.  At the time CT abdomen pelvis revealed severe sigmoid diverticulitis with inflammatory phlegmon formation and possible colovesicular fistula. At that time he was treated with ertapenem, Flagyl, and also treated for alcohol withdrawal.  Surgery was consulted and recommended no surgical intervention at the time.   He later presented to [facility] again on 1/15/2023 for evaluation of lower abdominal pain and bubbles in his urine.  Urine cultures at the time revealed resistance to ciprofloxacin.  Repeat CT revealed sigmoid colovesicular fistula and patient was transferred to [facility] for higher level of care on 1/17/2023.  (Records not available from [facility]) Per patient while he was at [facility] he was told that he required IV antibiotics and surgical intervention on outpatient basis after antibiotic treatment for 2 weeks and was discharged on 1/19/2023. Patient states that he was discharged on p.o. Augmentin with a Taxi voucher that took him somewhere in [place] rather than back to [place] and he has been homeless since then. He presented to [facility] and was discharged back to the  "shelter 1/19. He was brought by ambulance from [facility] on 1/20 for continued abd pain.     While admitted here general surgery was consulted and recommended liquid diet, broad spectrum abx and plan for possible sigmoid resection by Dr. Leong. Urine culture from admission was positive for ESBL e coli on 1/23 so patient was started on Meropenem. On 1/28 he underwent sigmoid colon resection with primary anastomosis and takedown of colovesicular fistula. On 1/28 patient underwent sigmoid colon resection and take down of colovesicular fistula.     Interval Problem Update  This morning Mr Yip said he was in severe pain and that even though the medications were helping he was still hurting. He reported no Bms yet but said he needed to go soon. He also said peeing hurts and that it \"feels like a jackhammer and a needle going around in there\".   -Continue PRN oxycodone and morphine   -Continue meropenem     I have discussed this patient's plan of care and discharge plan at IDT rounds today with Case Management, Nursing, Nursing leadership, and other members of the IDT team.    Consultants/Specialty  general surgery    Code Status  Full Code    Disposition  Patient is not medically cleared for discharge.   Anticipate discharge to to home with close outpatient follow-up.  I have placed the appropriate orders for post-discharge needs.    Review of Systems  Review of Systems   Constitutional:  Positive for malaise/fatigue. Negative for fever.   HENT:  Negative for sinus pain and sore throat.    Respiratory:  Negative for cough, hemoptysis, sputum production, shortness of breath and wheezing.    Cardiovascular:  Negative for chest pain, palpitations, leg swelling and PND.   Gastrointestinal:  Positive for abdominal pain and nausea. Negative for blood in stool, constipation, diarrhea, heartburn, melena and vomiting.   Genitourinary:  Positive for dysuria. Negative for flank pain, frequency, hematuria and urgency.        " Patient said he had pain with peeing, however currently has a catheter.    Musculoskeletal:  Positive for back pain and myalgias. Negative for neck pain.   Neurological:  Negative for dizziness, weakness and headaches.      Physical Exam  Temp:  [36.2 °C (97.1 °F)-36.5 °C (97.7 °F)] 36.3 °C (97.3 °F)  Pulse:  [] 98  Resp:  [12-25] 18  BP: (109-157)/() 133/74  SpO2:  [90 %-100 %] 92 %    Physical Exam  Vitals and nursing note reviewed.   Constitutional:       Appearance: Normal appearance. He is normal weight.   HENT:      Head: Normocephalic and atraumatic.      Mouth/Throat:      Mouth: Mucous membranes are dry.      Pharynx: No oropharyngeal exudate or posterior oropharyngeal erythema.   Cardiovascular:      Rate and Rhythm: Regular rhythm. Tachycardia present.      Pulses: Normal pulses.      Heart sounds: Normal heart sounds. No murmur heard.    No friction rub. No gallop.   Pulmonary:      Effort: Pulmonary effort is normal. No respiratory distress.      Breath sounds: Normal breath sounds. No stridor. No wheezing, rhonchi or rales.   Abdominal:      General: Abdomen is flat. Bowel sounds are normal. There is no distension.      Palpations: There is no mass.      Tenderness: There is abdominal tenderness. There is guarding. There is no rebound.      Comments: Tender to palpation all quadrants.    Neurological:      Mental Status: He is alert.       Fluids    Intake/Output Summary (Last 24 hours) at 1/29/2023 1122  Last data filed at 1/29/2023 1000  Gross per 24 hour   Intake 2960 ml   Output 1750 ml   Net 1210 ml       Laboratory  Recent Labs     01/27/23  0054 01/28/23  0752   WBC 13.7* 11.5*   RBC 5.26 5.15   HEMOGLOBIN 16.3 15.9   HEMATOCRIT 49.0 46.7   MCV 93.2 90.7   MCH 31.0 30.9   MCHC 33.3* 34.0   RDW 42.8 41.8   PLATELETCT 352 309   MPV 8.9* 8.6*     Recent Labs     01/28/23  0752   SODIUM 131*   POTASSIUM 4.4   CHLORIDE 97   CO2 24   GLUCOSE 110*   BUN 11   CREATININE 0.67   CALCIUM 9.2      Recent Labs     01/28/23  0752   APTT 34.1   INR 1.04               Imaging  CT-ABDOMEN-PELVIS WITH    Result Date: 1/27/2023  1.  Redemonstrated findings of sigmoid diverticulitis with tiny extraluminal air suggesting localized perforation and some adjacent phlegmon. There is no drainable abscess. 2.  Redemonstrated is a small colovesicular fistula involving the bladder dome with associated wall thickening and persistent air within the urinary bladder. 3.  Changes of chronic liver disease/cirrhosis and mild splenomegaly. 4.  Additional stable findings as detailed.    CT-ABDOMEN-PELVIS WITH    Result Date: 1/21/2023  1.  Changes of sigmoid diverticulitis, small collection of fluid with focus of air adjacent to sigmoid colon is seen compatible with microperforation and small diverticular abscess. 2.  Colovesicular fistula is seen with air in the bladder. 3.  Irregular hepatic contour favoring changes of cirrhosis 4.  Segment 2 duodenal diverticula 5.  Small fat-containing right inguinal hernia 6.  Left nephrolithiasis 7.  Cholelithiasis 8.  Atherosclerosis These findings were discussed with the patient's clinician, Kenji Coronel, on 1/21/2023 12:22 AM.    CT-RENAL COLIC EVALUATION(A/P W/O)    Result Date: 1/4/2023  IMPRESSION: 1. Severe sigmoid diverticulosis with severe diverticulitis with inflammatory phlegmon along the right with inflammatory changes extending to the dome of the bladder and 2 adjacent loop of small bowel. No drainable abscess is present. 2. These findings were discussed with Dr. Gore shortly following completion of the study.    Electronically signed by: Carlos Taylor 1/4/2023 11:39 AM       [id number] VIEW    Result Date: 1/15/2023  IMPRESSION: 1. Mild adynamic ileus.       Electronically signed by: Carlos Taylor 1/15/2023 12:52 PM       DX-CHEST-PORTABLE (1 VIEW)    Result Date: 1/15/2023  IMPRESSION: 1. No evidence of acute process in the chest.    Electronically signed  by: Carlos Taylor 1/15/2023 12:50 PM       CT ABDOMEN-PELVIS WITH IV CONTRAST    Result Date: 1/15/2023  IMPRESSION: 1. Severe sigmoid diverticulitis with probable multiple small abscesses in the surrounding tissues. No large drainable abscess is seen. The inflammatory changes extend to the bladder. Air in the bladder is consistent with a sigmoid colon bladder fistula. 2. Tiny gallstone without evidence of acute cholecystitis or bile duct dilatation. 3. Splenomegaly without focal abnormality.    Electronically signed by: Carlos Taylor 1/15/2023 4:25 PM       CT-CTA CHEST PULMONARY ARTERY W/ RECONS    Result Date: 1/16/2023  IMPRESSION: 1. No evidence of pulmonary emboli. 2. New subsegmental atelectasis right lower lobe.    Electronically signed by: Carlos Taylor 1/16/2023 12:15 PM           Assessment/Plan  Problem Representation:    * Colovesical fistula    Assessment & Plan  -Seen on imaging on 1/17 and again on 1/21. Dx confirmed by air in bladder and by sx of pneumaturia.   -Per Dr. Leong who evaluated patient at [facility] 1/17, recommended 2 weeks of antibiotics prior to proceeding with any surgery.   -Prior cultures revealed resistance to ciprofloxacin.   -Repeat urine culture ESBL E Coli.   -s/p sigmoid resection with primary anastomosis and take down of colovesicular fistula on 1/28.   Plan:   -Continue Meropenem (started 1/23)      Complicated Diverticulitis- (present on admission)  Assessment & Plan  -Complicated by multiple phlegmon and possible colovesicular fistula (seen on CT 1/21)  -Repeat CT 1/27 noted extraluminal air around sigmoid diverticulitis suggesting local perforation.   -s/p sigmoid resection with primary anastomosis and take down of colovesicular fistula on 1/28.   Plan:   -Pain control with scheduled tylenol, oxycodone 5-15 mg q3h prn, and IV morphine 4mg q3h prn.   -IV meropenem      Hyponatremia  Assessment & Plan  -Serum Osm 284 (WNL) indicating  isotonic hyponatremia. Ddx hyperproteinuria or hyperlipidemia.   -128 on admission. Improving to 130s with PO intake.   -Pt has recurrent myalgias, could be sx of hyponatremia.  -Uosm and Amanda elevated.   -Lipid panel 6/6/22 WNL  -UA 1/20 notable for proteinuria.   Plan:   -can consider ordering urine protein creatinine ratio if hyponatremia worsens.      Bacteremia (?)  Assessment & Plan  -Per outside hospital records blood cultures positive for gram positive cocci  -Repeat blood cultures negative.   -Requested [facility] records      COPD (chronic obstructive pulmonary disease) (HCC)  Assessment & Plan  -No PFTs on file  -Chest x-ray clear on admission, no wheezing on exam  -Symbicort daily  -As needed albuterol     Alcoholic cirrhosis of liver without ascites (HCC)  Assessment & Plan  -Cirrhotic changes seen on imaging  -Patient is mildly distended on exam however no clear signs of ascites.  No hepatic encephalopathy at this time.      History of methamphetamine use  Assessment & Plan  -Patient denies any use in the past several weeks  -Encourage continued sobriety     Nephrolithiasis  Assessment & Plan  -Nonobstructing nephrolithiasis, incidental finding on imaging.  -Continue to monitor renal function and urine output     Cholelithiasis  Assessment & Plan  -Asymptomatic  -Outpatient follow up      VTE prophylaxis: SCDs/TEDs    I have performed a physical exam and reviewed and updated ROS and Plan today (1/29/2023). In review of yesterday's note (1/28/2023), there are no changes except as documented above.

## 2023-01-29 NOTE — CARE PLAN
Problem: Pain - Standard  Goal: Alleviation of pain or a reduction in pain to the patient’s comfort goal  Outcome: Progressing     Problem: Knowledge Deficit - Standard  Goal: Patient and family/care givers will demonstrate understanding of plan of care, disease process/condition, diagnostic tests and medications  Outcome: Progressing   The patient is Stable - Low risk of patient condition declining or worsening    Shift Goals  Clinical Goals: pain management  Patient Goals: discharge  Family Goals: celestina

## 2023-01-30 LAB — PATHOLOGY CONSULT NOTE: NORMAL

## 2023-01-30 PROCEDURE — 700111 HCHG RX REV CODE 636 W/ 250 OVERRIDE (IP): Performed by: HOSPITALIST

## 2023-01-30 PROCEDURE — 99024 POSTOP FOLLOW-UP VISIT: CPT | Performed by: NURSE PRACTITIONER

## 2023-01-30 PROCEDURE — 700111 HCHG RX REV CODE 636 W/ 250 OVERRIDE (IP)

## 2023-01-30 PROCEDURE — A9270 NON-COVERED ITEM OR SERVICE: HCPCS | Performed by: SURGERY

## 2023-01-30 PROCEDURE — 700102 HCHG RX REV CODE 250 W/ 637 OVERRIDE(OP)

## 2023-01-30 PROCEDURE — 770001 HCHG ROOM/CARE - MED/SURG/GYN PRIV*

## 2023-01-30 PROCEDURE — 700102 HCHG RX REV CODE 250 W/ 637 OVERRIDE(OP): Performed by: SURGERY

## 2023-01-30 PROCEDURE — 99233 SBSQ HOSP IP/OBS HIGH 50: CPT | Mod: GC | Performed by: HOSPITALIST

## 2023-01-30 PROCEDURE — A9270 NON-COVERED ITEM OR SERVICE: HCPCS | Performed by: HOSPITALIST

## 2023-01-30 PROCEDURE — A9270 NON-COVERED ITEM OR SERVICE: HCPCS

## 2023-01-30 PROCEDURE — 700102 HCHG RX REV CODE 250 W/ 637 OVERRIDE(OP): Performed by: HOSPITALIST

## 2023-01-30 PROCEDURE — 700105 HCHG RX REV CODE 258: Performed by: HOSPITALIST

## 2023-01-30 PROCEDURE — 99024 POSTOP FOLLOW-UP VISIT: CPT | Performed by: SURGERY

## 2023-01-30 RX ADMIN — BUDESONIDE AND FORMOTEROL FUMARATE DIHYDRATE 2 PUFF: 160; 4.5 AEROSOL RESPIRATORY (INHALATION) at 17:26

## 2023-01-30 RX ADMIN — OXYCODONE HYDROCHLORIDE 15 MG: 5 TABLET ORAL at 03:21

## 2023-01-30 RX ADMIN — OXYCODONE HYDROCHLORIDE 15 MG: 5 TABLET ORAL at 08:24

## 2023-01-30 RX ADMIN — BUDESONIDE AND FORMOTEROL FUMARATE DIHYDRATE 2 PUFF: 160; 4.5 AEROSOL RESPIRATORY (INHALATION) at 05:03

## 2023-01-30 RX ADMIN — MEROPENEM 500 MG: 500 INJECTION, POWDER, FOR SOLUTION INTRAVENOUS at 05:07

## 2023-01-30 RX ADMIN — OXYCODONE HYDROCHLORIDE 15 MG: 5 TABLET ORAL at 17:30

## 2023-01-30 RX ADMIN — ENOXAPARIN SODIUM 40 MG: 40 INJECTION SUBCUTANEOUS at 17:26

## 2023-01-30 RX ADMIN — OXYCODONE HYDROCHLORIDE 15 MG: 5 TABLET ORAL at 17:36

## 2023-01-30 RX ADMIN — MEROPENEM 500 MG: 500 INJECTION, POWDER, FOR SOLUTION INTRAVENOUS at 17:36

## 2023-01-30 RX ADMIN — MEROPENEM 500 MG: 500 INJECTION, POWDER, FOR SOLUTION INTRAVENOUS at 21:44

## 2023-01-30 RX ADMIN — Medication 5 MG: at 21:37

## 2023-01-30 RX ADMIN — ACETAMINOPHEN 1000 MG: 500 TABLET, FILM COATED ORAL at 21:36

## 2023-01-30 RX ADMIN — MEROPENEM 500 MG: 500 INJECTION, POWDER, FOR SOLUTION INTRAVENOUS at 10:55

## 2023-01-30 RX ADMIN — OXYCODONE HYDROCHLORIDE 15 MG: 5 TABLET ORAL at 21:36

## 2023-01-30 ASSESSMENT — PAIN DESCRIPTION - PAIN TYPE
TYPE: SURGICAL PAIN;ACUTE PAIN
TYPE: SURGICAL PAIN
TYPE: SURGICAL PAIN;ACUTE PAIN
TYPE: SURGICAL PAIN
TYPE: SURGICAL PAIN
TYPE: SURGICAL PAIN;ACUTE PAIN

## 2023-01-30 NOTE — PROGRESS NOTES
DATE: 1/30/2023    Post Operative Day  2  Sigmoid colon resection with primary anastomosis and takedown of colovesical fistula, mobilization of the splenic flexure    INTERVAL EVENTS:  Tolerating clears  Reports flatus    - Continue Martin  - Mobilize  - May advance to full liquids  - Hold bowel stimulants due to anastomosis     PHYSICAL EXAMINATION:  Vital Signs: Blood Pressure (Abnormal) 141/90   Pulse 94   Temperature 37.3 °C (99.2 °F) (Temporal)   Respiration 18   Height 1.829 m (6')   Weight 79.5 kg (175 lb 4.3 oz)   Oxygen Saturation 91%     Physical Exam  Vitals and nursing note reviewed.   Constitutional:       General: He is not in acute distress.     Appearance: He is not toxic-appearing.   HENT:      Mouth/Throat:      Mouth: Mucous membranes are moist.      Pharynx: Oropharynx is clear.   Eyes:      Conjunctiva/sclera: Conjunctivae normal.   Cardiovascular:      Rate and Rhythm: Normal rate.      Pulses: Normal pulses.   Pulmonary:      Effort: Pulmonary effort is normal.   Chest:      Chest wall: No tenderness.   Abdominal:      General: There is no distension.      Palpations: Abdomen is soft.      Tenderness: There is no abdominal tenderness. There is no guarding.      Comments: Midline incision approximated with staples   Genitourinary:     Comments: Martin in place with dark yellow urine  Musculoskeletal:      Comments: Moves all extremities   Skin:     General: Skin is warm and dry.   Neurological:      Mental Status: He is alert.         LABORATORY VALUES:   Recent Labs     01/28/23  0752   WBC 11.5*   RBC 5.15   HEMOGLOBIN 15.9   HEMATOCRIT 46.7   MCV 90.7   MCH 30.9   MCHC 34.0   RDW 41.8   PLATELETCT 309   MPV 8.6*     Recent Labs     01/28/23  0752   SODIUM 131*   POTASSIUM 4.4   CHLORIDE 97   CO2 24   GLUCOSE 110*   BUN 11   CREATININE 0.67   CALCIUM 9.2     Recent Labs     01/28/23  0752   ASTSGOT 57*   ALTSGPT 49   TBILIRUBIN 1.3   ALKPHOSPHAT 148*   GLOBULIN 4.3*   INR 1.04      Recent Labs     01/28/23  0752   APTT 34.1   INR 1.04        IMAGING:   CT-ABDOMEN-PELVIS WITH   Final Result      1.  Redemonstrated findings of sigmoid diverticulitis with tiny extraluminal air suggesting localized perforation and some adjacent phlegmon. There is no drainable abscess.   2.  Redemonstrated is a small colovesicular fistula involving the bladder dome with associated wall thickening and persistent air within the urinary bladder.   3.  Changes of chronic liver disease/cirrhosis and mild splenomegaly.   4.  Additional stable findings as detailed.      CT-ABDOMEN-PELVIS WITH   Final Result         1.  Changes of sigmoid diverticulitis, small collection of fluid with focus of air adjacent to sigmoid colon is seen compatible with microperforation and small diverticular abscess.   2.  Colovesicular fistula is seen with air in the bladder.   3.  Irregular hepatic contour favoring changes of cirrhosis   4.  Segment 2 duodenal diverticula   5.  Small fat-containing right inguinal hernia   6.  Left nephrolithiasis   7.  Cholelithiasis   8.  Atherosclerosis      These findings were discussed with the patient's clinician, Kenji Coronel, on 1/21/2023 12:22 AM.          ASSESSMENT AND PLAN:   * Colovesical fistula- (present on admission)  Assessment & Plan  Associated with acute complicated diverticulitis.   1/28 Sigmoid colon resection with primary anastomosis and takedown of colovesical fistula, mobilization of the splenic flexure  ACS blue service           ____________________________________     YVES Mendoza    DD: 1/30/2023  10:39 AM

## 2023-01-30 NOTE — CARE PLAN
The patient is Watcher - Medium risk of patient condition declining or worsening    Shift Goals  Clinical Goals: Pain mgmt, IV abx  Patient Goals: rest, pain control, go home  Family Goals: Not present, STEW    Progress made toward(s) clinical / shift goals:    Problem: Pain - Standard  Goal: Alleviation of pain or a reduction in pain to the patient’s comfort goal  Outcome: Progressing  Note: Pt c/o abd pain that is relieved by PRN oxy and morphine.     Problem: Communication  Goal: The ability to communicate needs accurately and effectively will improve  Outcome: Progressing  Note: Encourage pt to communicate needs with nursing staff.     Problem: Fall Risk  Goal: Patient will remain free from falls  Outcome: Progressing  Note: Pt has been free from falls this shift.     EOS Progress Note:    Assume care of pt from 7151-2135.    - VSS  - PIV flushes w/o difficulty, saline locked  - Abd dressing dry, intact w/ old drainage  - Martin draining red/pink urine  - Pain controlled w/ PRN oxy and morphine  - Uses call bell appropriately  - Refuses bed alarm    Pt resting comfortably at this time. Bed is in lowest position and locked w/ bed alarm activated. Call bell is within reach.

## 2023-01-30 NOTE — DIETARY
Nutrition services: Day 9 of admit.  Jason Yip is a 62 y.o. male with admitting DX of Diverticulitis     Potentially inadequate nutrition provisions observed with Full liquids/ CLD since admission    Assessment:  Height: 182.9 cm (6')  Weight: 79.5 kg (175 lb 4.3 oz)  Body mass index is 23.77 kg/m²., BMI classification: WNL  Diet/Intake: Clear liquid diet     Evaluation:   Hx of Colovesical fistula, COPD, alcoholic cirrhosis of liver without ascites, hx of methamphetamine abuse  Noted postoperative day #2 from sigmoid colon resection and takedown of colovesical fistula. MD noted plan to advance to fulls, though pt remains CLD at this time.   Per ADLs, pt consuming mostly % from meals provided. A clear liquid diet cannot provide sufficient protein for pt. RD to advance to full liquid diet  as tolerated as this diet can meet nutrition needs.   Per chart review, weight hx with unknown measurement sources  Wt Readings from Last 4 Encounters:   01/28/23 79.5 kg (175 lb 4.3 oz)   No indication of weight changes at this time, though recent wt hx is limited. Though appears stable per weight during admission from 79.7 kg on 1/21 via bed scale to current weight on 1/28 at 79.5 kg.   Skin: No pressure injuries nor edema noted.   Pertinent labs: Na 131, Glucose 110, albumin 2.9, A1c 6% on 1/5/23  Pertinent meds: lovenox, melatonin, merrem  Last BM none charted since admission, RD to discuss with RN    Malnutrition Risk: No criteria met at this time.     Problem: Nutritional:  Goal: Achieve adequate nutritional intake    Recommendations/Plan:  Patient will advance to > CLD and continue to consume >50%  Encourage intake of meals   Document intake of all meals and/or supplements as % taken in ADL's to provide interdisciplinary communication across all shifts.   Monitor weight.  Nutrition rep will continue to see patient for ongoing meal and snack preferences.     RD following

## 2023-01-31 LAB
ANION GAP SERPL CALC-SCNC: 9 MMOL/L (ref 7–16)
BUN SERPL-MCNC: 14 MG/DL (ref 8–22)
CALCIUM SERPL-MCNC: 9.4 MG/DL (ref 8.5–10.5)
CHLORIDE SERPL-SCNC: 93 MMOL/L (ref 96–112)
CO2 SERPL-SCNC: 30 MMOL/L (ref 20–33)
CREAT SERPL-MCNC: 0.72 MG/DL (ref 0.5–1.4)
GFR SERPLBLD CREATININE-BSD FMLA CKD-EPI: 103 ML/MIN/1.73 M 2
GLUCOSE SERPL-MCNC: 117 MG/DL (ref 65–99)
POTASSIUM SERPL-SCNC: 4.3 MMOL/L (ref 3.6–5.5)
SODIUM SERPL-SCNC: 132 MMOL/L (ref 135–145)

## 2023-01-31 PROCEDURE — 700105 HCHG RX REV CODE 258: Performed by: HOSPITALIST

## 2023-01-31 PROCEDURE — 700102 HCHG RX REV CODE 250 W/ 637 OVERRIDE(OP)

## 2023-01-31 PROCEDURE — 80048 BASIC METABOLIC PNL TOTAL CA: CPT

## 2023-01-31 PROCEDURE — 770001 HCHG ROOM/CARE - MED/SURG/GYN PRIV*

## 2023-01-31 PROCEDURE — 36415 COLL VENOUS BLD VENIPUNCTURE: CPT

## 2023-01-31 PROCEDURE — 97535 SELF CARE MNGMENT TRAINING: CPT

## 2023-01-31 PROCEDURE — 97161 PT EVAL LOW COMPLEX 20 MIN: CPT

## 2023-01-31 PROCEDURE — A9270 NON-COVERED ITEM OR SERVICE: HCPCS

## 2023-01-31 PROCEDURE — 97165 OT EVAL LOW COMPLEX 30 MIN: CPT

## 2023-01-31 PROCEDURE — 99232 SBSQ HOSP IP/OBS MODERATE 35: CPT | Mod: GC | Performed by: INTERNAL MEDICINE

## 2023-01-31 PROCEDURE — 700102 HCHG RX REV CODE 250 W/ 637 OVERRIDE(OP): Performed by: SURGERY

## 2023-01-31 PROCEDURE — 700111 HCHG RX REV CODE 636 W/ 250 OVERRIDE (IP)

## 2023-01-31 PROCEDURE — 99024 POSTOP FOLLOW-UP VISIT: CPT | Performed by: SURGERY

## 2023-01-31 PROCEDURE — 700111 HCHG RX REV CODE 636 W/ 250 OVERRIDE (IP): Performed by: HOSPITALIST

## 2023-01-31 PROCEDURE — A9270 NON-COVERED ITEM OR SERVICE: HCPCS | Performed by: SURGERY

## 2023-01-31 RX ADMIN — BUDESONIDE AND FORMOTEROL FUMARATE DIHYDRATE 2 PUFF: 160; 4.5 AEROSOL RESPIRATORY (INHALATION) at 06:02

## 2023-01-31 RX ADMIN — MORPHINE SULFATE 2 MG: 4 INJECTION INTRAVENOUS at 14:42

## 2023-01-31 RX ADMIN — OXYCODONE HYDROCHLORIDE 15 MG: 5 TABLET ORAL at 11:56

## 2023-01-31 RX ADMIN — MEROPENEM 500 MG: 500 INJECTION, POWDER, FOR SOLUTION INTRAVENOUS at 17:37

## 2023-01-31 RX ADMIN — MEROPENEM 500 MG: 500 INJECTION, POWDER, FOR SOLUTION INTRAVENOUS at 06:02

## 2023-01-31 RX ADMIN — MEROPENEM 500 MG: 500 INJECTION, POWDER, FOR SOLUTION INTRAVENOUS at 22:59

## 2023-01-31 RX ADMIN — ENOXAPARIN SODIUM 40 MG: 40 INJECTION SUBCUTANEOUS at 17:36

## 2023-01-31 RX ADMIN — OXYCODONE HYDROCHLORIDE 15 MG: 5 TABLET ORAL at 08:45

## 2023-01-31 RX ADMIN — OXYCODONE HYDROCHLORIDE 15 MG: 5 TABLET ORAL at 22:45

## 2023-01-31 RX ADMIN — Medication 5 MG: at 22:46

## 2023-01-31 RX ADMIN — MEROPENEM 500 MG: 500 INJECTION, POWDER, FOR SOLUTION INTRAVENOUS at 10:36

## 2023-01-31 RX ADMIN — BUDESONIDE AND FORMOTEROL FUMARATE DIHYDRATE 2 PUFF: 160; 4.5 AEROSOL RESPIRATORY (INHALATION) at 17:37

## 2023-01-31 ASSESSMENT — COGNITIVE AND FUNCTIONAL STATUS - GENERAL
DAILY ACTIVITIY SCORE: 24
MOVING TO AND FROM BED TO CHAIR: A LITTLE
SUGGESTED CMS G CODE MODIFIER DAILY ACTIVITY: CH
SUGGESTED CMS G CODE MODIFIER MOBILITY: CI
MOBILITY SCORE: 23

## 2023-01-31 ASSESSMENT — GAIT ASSESSMENTS
DISTANCE (FEET): 50
GAIT LEVEL OF ASSIST: SUPERVISED

## 2023-01-31 ASSESSMENT — PAIN DESCRIPTION - PAIN TYPE
TYPE: ACUTE PAIN
TYPE: ACUTE PAIN;SURGICAL PAIN
TYPE: ACUTE PAIN;SURGICAL PAIN
TYPE: ACUTE PAIN
TYPE: ACUTE PAIN;SURGICAL PAIN
TYPE: ACUTE PAIN;SURGICAL PAIN

## 2023-01-31 ASSESSMENT — ACTIVITIES OF DAILY LIVING (ADL): TOILETING: INDEPENDENT

## 2023-01-31 NOTE — PROGRESS NOTES
[facility] Internal Medicine Daily Progress Note    Date of Service  1/30/2023    [facility] Team: [facility] IM White Team   Attending: Joni Nieves M.d.  Senior Resident: Dr. Kumar  Intern:  Dr. Noe   Contact Number: [phone]    Chief Complaint  Jason Yip is a 62 y.o. male admitted 1/20/2023 with abdominal pain     Hospital Course  Jason Yip is a 62 y.o. male who presented 1/20/2023 abdominal pain.   He has a past medical history of cirrhosis (seen on imaging) COPD and polysubstance use (however patient states that he quit several weeks ago). Patient initially presented to [facility] in [facility] on 1/4/2023 reporting 2 months of difficulty with urination. On initial presentation he was found to be septic with a leukocytosis of over 14,000.  At the time CT abdomen pelvis revealed severe sigmoid diverticulitis with inflammatory phlegmon formation and possible colovesicular fistula. At that time he was treated with ertapenem, Flagyl, and also treated for alcohol withdrawal.  Surgery was consulted and recommended no surgical intervention at the time.   He later presented to [facility] again on 1/15/2023 for evaluation of lower abdominal pain and bubbles in his urine.  Urine cultures at the time revealed resistance to ciprofloxacin.  Repeat CT revealed sigmoid colovesicular fistula and patient was transferred to [facility] for higher level of care on 1/17/2023.  (Records not available from [facility]) Per patient while he was at [facility] he was told that he required IV antibiotics and surgical intervention on outpatient basis after antibiotic treatment for 2 weeks and was discharged on 1/19/2023. Patient states that he was discharged on p.o. Augmentin with a Taxi voucher that took him somewhere in [place] rather than back to [place] and he has been homeless since then. He presented to [facility] and was discharged back to the  shelter 1/19. He was brought by ambulance from [facility] on 1/20 for continued abd pain.     While admitted here general surgery was consulted and recommended liquid diet, broad spectrum abx and plan for possible sigmoid resection by Dr. Leong. Urine culture from admission was positive for ESBL e coli on 1/23 so patient was started on Meropenem. On 1/28 he underwent sigmoid colon resection with primary anastomosis and takedown of colovesicular fistula. On 1/28 patient underwent sigmoid colon resection and take down of colovesicular fistula.     Interval Problem Update  This morning Mr. Yip said he was doing okay. He reported continued abdominal pain but reduced compared to yesterday and alleviated with current pain medications. He had no other concerns and said he just wanted to get better and go home.   -Per surgery continue bullard at least 5 days s/p surgery   -full liquid diet     I have discussed this patient's plan of care and discharge plan at IDT rounds today with Case Management, Nursing, Nursing leadership, and other members of the IDT team.    Consultants/Specialty  general surgery    Code Status  Full Code    Disposition  Patient is not medically cleared for discharge.   Anticipate discharge to to home with close outpatient follow-up.  I have placed the appropriate orders for post-discharge needs.    Review of Systems  Review of Systems   Constitutional:  Positive for malaise/fatigue. Negative for fever.   HENT:  Negative for sinus pain and sore throat.    Respiratory:  Negative for cough, hemoptysis, sputum production, shortness of breath and wheezing.    Cardiovascular:  Negative for chest pain, palpitations, leg swelling and PND.   Gastrointestinal:  Positive for abdominal pain and nausea. Negative for blood in stool, constipation, diarrhea, heartburn, melena and vomiting.   Genitourinary:  Positive for dysuria. Negative for flank pain, frequency, hematuria and urgency.        Patient said he had  pain with peeing, however currently has a catheter.    Musculoskeletal:  Positive for back pain and myalgias. Negative for neck pain.   Neurological:  Negative for dizziness, weakness and headaches.       Physical Exam  Temp:  [36.4 °C (97.6 °F)-37.3 °C (99.2 °F)] 36.4 °C (97.6 °F)  Pulse:  [] 81  Resp:  [18-19] 18  BP: (111-153)/(76-98) 111/76  SpO2:  [90 %-94 %] 93 %    Physical Exam  Vitals and nursing note reviewed.   Constitutional:       Appearance: Normal appearance. He is normal weight.   HENT:      Head: Normocephalic and atraumatic.      Mouth/Throat:      Mouth: Mucous membranes are dry.      Pharynx: No oropharyngeal exudate or posterior oropharyngeal erythema.   Cardiovascular:      Rate and Rhythm: Regular rate and rhythm.      Pulses: Normal pulses.      Heart sounds: Normal heart sounds. No murmur heard.    No friction rub. No gallop.   Pulmonary:      Effort: Pulmonary effort is normal. No respiratory distress.      Breath sounds: Normal breath sounds. No stridor. No wheezing, rhonchi or rales.   Abdominal:      General: Abdomen is flat. Bowel sounds are normal. There is no distension.      Palpations: There is no mass.      Tenderness: There is abdominal tenderness. There is guarding. There is no rebound.      Comments: Tender to palpation all quadrants.    Neurological:      Mental Status: He is alert.     Fluids    Intake/Output Summary (Last 24 hours) at 1/30/2023 1610  Last data filed at 1/30/2023 0300  Gross per 24 hour   Intake --   Output 1900 ml   Net -1900 ml       Laboratory  Recent Labs     01/28/23  0752   WBC 11.5*   RBC 5.15   HEMOGLOBIN 15.9   HEMATOCRIT 46.7   MCV 90.7   MCH 30.9   MCHC 34.0   RDW 41.8   PLATELETCT 309   MPV 8.6*     Recent Labs     01/28/23  0752   SODIUM 131*   POTASSIUM 4.4   CHLORIDE 97   CO2 24   GLUCOSE 110*   BUN 11   CREATININE 0.67   CALCIUM 9.2     Recent Labs     01/28/23  0752   APTT 34.1   INR 1.04               Imaging  CT-ABDOMEN-PELVIS  WITH    Result Date: 1/27/2023  1.  Redemonstrated findings of sigmoid diverticulitis with tiny extraluminal air suggesting localized perforation and some adjacent phlegmon. There is no drainable abscess. 2.  Redemonstrated is a small colovesicular fistula involving the bladder dome with associated wall thickening and persistent air within the urinary bladder. 3.  Changes of chronic liver disease/cirrhosis and mild splenomegaly. 4.  Additional stable findings as detailed.    CT-ABDOMEN-PELVIS WITH    Result Date: 1/21/2023  1.  Changes of sigmoid diverticulitis, small collection of fluid with focus of air adjacent to sigmoid colon is seen compatible with microperforation and small diverticular abscess. 2.  Colovesicular fistula is seen with air in the bladder. 3.  Irregular hepatic contour favoring changes of cirrhosis 4.  Segment 2 duodenal diverticula 5.  Small fat-containing right inguinal hernia 6.  Left nephrolithiasis 7.  Cholelithiasis 8.  Atherosclerosis These findings were discussed with the patient's clinician, Kenji Coronel, on 1/21/2023 12:22 AM.    CT-RENAL COLIC EVALUATION(A/P W/O)    Result Date: 1/4/2023  IMPRESSION: 1. Severe sigmoid diverticulosis with severe diverticulitis with inflammatory phlegmon along the right with inflammatory changes extending to the dome of the bladder and 2 adjacent loop of small bowel. No drainable abscess is present. 2. These findings were discussed with Dr. Gore shortly following completion of the study.    Electronically signed by: Carlos Taylor 1/4/2023 11:39 AM       VC-MSTOEHY-1 VIEW    Result Date: 1/15/2023  IMPRESSION: 1. Mild adynamic ileus.       Electronically signed by: Carlos Taylor 1/15/2023 12:52 PM       DX-CHEST-PORTABLE (1 VIEW)    Result Date: 1/15/2023  IMPRESSION: 1. No evidence of acute process in the chest.    Electronically signed by: Carlos Taylor 1/15/2023 12:50 PM       CT ABDOMEN-PELVIS WITH IV CONTRAST    Result Date:  1/15/2023  IMPRESSION: 1. Severe sigmoid diverticulitis with probable multiple small abscesses in the surrounding tissues. No large drainable abscess is seen. The inflammatory changes extend to the bladder. Air in the bladder is consistent with a sigmoid colon bladder fistula. 2. Tiny gallstone without evidence of acute cholecystitis or bile duct dilatation. 3. Splenomegaly without focal abnormality.    Electronically signed by: Carlos Taylor 1/15/2023 4:25 PM       CT-CTA CHEST PULMONARY ARTERY W/ RECONS    Result Date: 1/16/2023  IMPRESSION: 1. No evidence of pulmonary emboli. 2. New subsegmental atelectasis right lower lobe.    Electronically signed by: Carlos Taylor 1/16/2023 12:15 PM           Assessment/Plan  Problem Representation:    * Colovesical fistula    Assessment & Plan  -Seen on imaging on 1/17 and again on 1/21. Dx confirmed by air in bladder and by sx of pneumaturia.   -Per Dr. Leong who evaluated patient at [facility] 1/17, recommended 2 weeks of antibiotics prior to proceeding with any surgery.   -Prior cultures revealed resistance to ciprofloxacin.   -Repeat urine culture ESBL E Coli.   -s/p sigmoid resection with primary anastomosis and take down of colovesicular fistula on 1/28.   Plan:   -Tylenol, Oxycodone, and morphine for pain.   -Continue Meropenem (started 1/23)   -Continue bullard at least until 2/2.      Complicated Diverticulitis- (present on admission)  Assessment & Plan  -Complicated by multiple phlegmon and possible colovesicular fistula (seen on CT 1/21)  -Repeat CT 1/27 noted extraluminal air around sigmoid diverticulitis suggesting local perforation.   -s/p sigmoid resection with primary anastomosis and take down of colovesicular fistula on 1/28.   Plan:   -Pain control with scheduled tylenol, oxycodone 5-15 mg q3h prn, and IV morphine 4mg q3h prn.   -IV meropenem until 2/2      Hyponatremia  Assessment & Plan  -Serum Osm 284 (WNL) indicating isotonic  hyponatremia. Ddx hyperproteinuria or hyperlipidemia.   -128 on admission. Improving to 130s with PO intake.   -Pt has recurrent myalgias, could be sx of hyponatremia.  -Uosm and Amanda elevated.   -Lipid panel 6/6/22 WNL  -UA 1/20 notable for proteinuria.   Plan:   -can consider ordering urine protein creatinine ratio if hyponatremia worsens.      Bacteremia (?)  Assessment & Plan  -Per outside hospital records blood cultures positive for gram positive cocci  -Repeat blood cultures negative.   -Requested [facility] records      COPD (chronic obstructive pulmonary disease) (HCC)  Assessment & Plan  -No PFTs on file  -Chest x-ray clear on admission, no wheezing on exam  -Symbicort daily  -As needed albuterol     Alcoholic cirrhosis of liver without ascites (HCC)  Assessment & Plan  -Cirrhotic changes seen on imaging  -Patient is mildly distended on exam however no clear signs of ascites.  No hepatic encephalopathy at this time.      History of methamphetamine use  Assessment & Plan  -Patient denies any use in the past several weeks  -Encourage continued sobriety     Nephrolithiasis  Assessment & Plan  -Nonobstructing nephrolithiasis, incidental finding on imaging.  -Continue to monitor renal function and urine output     Cholelithiasis  Assessment & Plan  -Asymptomatic  -Outpatient follow up      VTE prophylaxis: SCDs/TEDs    I have performed a physical exam and reviewed and updated ROS and Plan today (1/30/2023). In review of yesterday's note (1/29/2023), there are no changes except as documented above.

## 2023-01-31 NOTE — CARE PLAN
The patient is Stable - Low risk of patient condition declining or worsening    Shift Goals  Clinical Goals: pain management, Iv Antibiotics  Patient Goals: pain control and rest  Family Goals: STEW    Progress made toward(s) clinical / shift goals:  Patient did display signs or symptoms of infection from his surgical wound. There was no drainage and his surgical staples remained clean and dry. Patients pain was managed with oral prn pain medication. Patient is compliant with medication schedule and treatment orders.

## 2023-01-31 NOTE — THERAPY
"Physical Therapy   Initial Evaluation     Patient Name: Jason Yip  Age:  62 y.o., Sex:  male  Medical Record #: [id number]  Today's Date: 1/31/2023    Assessment  Patient is a 62 y.o. male admitted with colovesical fistula d/t complex diverticulitis, now s/p sigmoid colon resection on 1/28. Pt seen for PT evaluation at this time. Pt reports he is staying at his sisters house and has assist if needed. Reports was indep with all mobility and ADLs PTA. Pt completed mobility without assist, ambulated without AD, no LOB. Pt reports no concerns with return home and appears functionally capable. No further acute PT needs.     Plan  Evaluation only  DC Equipment Recommendations: None  Discharge Recommendations: Anticipate that the patient will have no further physical therapy needs after discharge from the hospital     01/31/23 0928   Prior Living Situation   Prior Services None   Housing / Facility 1 Story House   Steps Into Home 3   Steps In Home 0   Equipment Owned None   Lives with -  Sibling   Comments pt reports has been staying at his sister's house and plans to DC back there, reports sister and nephews assist if needed. reports \"I need to go back there so I dont do drugs\"   Prior Level of Functional Mobility   Bed Mobility Independent   Transfer Status Independent   Ambulation Independent   Distance Ambulation (Feet) community distances   Assistive Devices Used None   Stairs Independent   Cognition    Level of Consciousness Alert   Comments cooperative, tangential   Other Treatments   Other Treatments Provided discussed home safety and incr mobility to incr activity tolerance, movement to prevent ab pain   Balance Assessment   Sitting Balance (Static) Good   Sitting Balance (Dynamic) Good   Standing Balance (Static) Fair +   Standing Balance (Dynamic) Fair +   Weight Shift Sitting Good   Weight Shift Standing Fair   Comments no AD   Bed Mobility    Supine to Sit Supervised   Scooting Supervised   Gait Analysis "   Gait Level Of Assist Supervised   Assistive Device None   Distance (Feet) 50   Weight Bearing Status no restrictions   Comments amb in room without LOB, declines use of AD, reports no concerns   Functional Mobility   Sit to Stand Supervised   Bed, Chair, Wheelchair Transfer Supervised

## 2023-01-31 NOTE — PROGRESS NOTES
Feels well  Having lots of flatus  Wants food    /71   Pulse 99   Temp 36.7 °C (98.1 °F) (Temporal)   Resp 18   Ht 1.829 m (6')   Wt 79.5 kg (175 lb 4.3 oz)   SpO2 95%   BMI 23.77 kg/m²     Abdomen soft, nondistended  Minimal tenderness  Dressing with minimal staining    No results for input(s): WBC, RBC, HEMOGLOBIN, HEMATOCRIT, MCV, MCH, RDW, PLATELETCT, MPV, NEUTSPOLYS, LYMPHOCYTES, MONOCYTES, EOSINOPHILS, BASOPHILS, RBCMORPHOLO in the last 72 hours.    Recent Labs     01/31/23  0048   SODIUM 132*   POTASSIUM 4.3   CHLORIDE 93*   CO2 30   GLUCOSE 117*   BUN 14   CREATININE 0.72       Assessment and plan:  Postop day 3 after segmental colectomy and closure of colovesical fistula  Patient doing very well  Okay to advance to low fiber diet  Continue supportive care

## 2023-01-31 NOTE — CARE PLAN
The patient is Stable - Low risk of patient condition declining or worsening    Shift Goals  Clinical Goals: Pain management, IV abx  Patient Goals: Rest, pain control, eat  Family Goals: STEW    No adverse events during shift. Pt hesitant to ambulate due to abdominal pain. Pt was able to ambulate and sit in chair for lunch today as well as 1 hour after eating. Pt is needing reinforcement with ambulation. No s/s of surgical infection at this time, vitals stable and no s/s of infection in surgical site. Will continue to monitor.     Progress made toward(s) clinical / shift goals:    Problem: Knowledge Deficit - Standard  Goal: Patient and family/care givers will demonstrate understanding of plan of care, disease process/condition, diagnostic tests and medications  Outcome: Progressing  Note: Plan of care discussed with pt. Pt was agreeable to ambulate and sit in chair for meals today. Will continue to reinforce.      Patient is not progressing towards the following goals:  Problem: Pain - Standard  Goal: Alleviation of pain or a reduction in pain to the patient’s comfort goal  Outcome: Not Progressing  Note: Pt c/o pain in abdomen.

## 2023-01-31 NOTE — THERAPY
"Occupational Therapy   Initial Evaluation     Patient Name: Jason Yip  Age:  62 y.o., Sex:  male  Medical Record #: [id number]  Today's Date: 1/31/2023     Precautions: Fall Risk  Comments: Abdominal prec    Assessment    Patient is 62 y.o. male with h/o COPD, ETOH abuse, recent admit to [facility] for colovesical fistula, now admitted for same. Pt underwent sigmoid colon resection, fistula closure on 1/28. Seen now for OT eval. Pt able to complete BADL and transfers with no more than supv. States he will return to [place] to stay with sister and her family who can assist if needed on DC. Patient will not be actively followed for occupational therapy services at this time, however may be seen if requested by physician for 1 more visit within 30 days to address any discharge or equipment needs.      Plan    Occupational Therapy Initial Treatment Plan   Duration: Evaluation only; DC needs only    DC Equipment Recommendations: None  Discharge Recommendations: Anticipate that the patient will have no further occupational therapy needs after discharge from the hospital     Subjective    \"Did they warn you about me?\"     Objective       01/31/23 0929   Prior Living Situation   Prior Services None;Home-Independent   Housing / Facility 1 Story House   Steps Into Home 5   Steps In Home 0   Rail Both Rail (Steps into Home)   Bathroom Set up Bathtub / Shower Combination   Equipment Owned None   Comments Pt reports he is staying at sister's home in [place]. States she and her sons can assist him if needed.   Prior Level of ADL Function   Comments Pt was independent with BADL PTA   Prior Level of IADL Function   Comments Pt was independent with I-ADL and functional mobility PTA   Balance Assessment   Sitting Balance (Static) Good   Sitting Balance (Dynamic) Good   Standing Balance (Static) Fair +   Standing Balance (Dynamic) Fair +   Weight Shift Sitting Good   Weight Shift Standing Good   Comments no AD, no LOB   Bed Mobility    Supine " to Sit Supervised   Sit to Supine   (up to chair post)   Scooting Supervised  (seated)   ADL Assessment   Grooming   (declined, already completed)   Lower Body Dressing Supervision  (doff/don B socks)   Toileting   (declined BM; Martin in place)   Functional Mobility   Sit to Stand Supervised   Bed, Chair, Wheelchair Transfer Supervised   Transfer Method Stand Step  (no AD)   Mobility Supine > EOB, short gait and transfers in room

## 2023-02-01 PROCEDURE — 700102 HCHG RX REV CODE 250 W/ 637 OVERRIDE(OP): Performed by: NURSE PRACTITIONER

## 2023-02-01 PROCEDURE — 700111 HCHG RX REV CODE 636 W/ 250 OVERRIDE (IP): Performed by: HOSPITALIST

## 2023-02-01 PROCEDURE — 99024 POSTOP FOLLOW-UP VISIT: CPT | Performed by: NURSE PRACTITIONER

## 2023-02-01 PROCEDURE — 99232 SBSQ HOSP IP/OBS MODERATE 35: CPT | Mod: GC | Performed by: INTERNAL MEDICINE

## 2023-02-01 PROCEDURE — 700102 HCHG RX REV CODE 250 W/ 637 OVERRIDE(OP): Performed by: SURGERY

## 2023-02-01 PROCEDURE — 770001 HCHG ROOM/CARE - MED/SURG/GYN PRIV*

## 2023-02-01 PROCEDURE — 99024 POSTOP FOLLOW-UP VISIT: CPT | Performed by: SURGERY

## 2023-02-01 PROCEDURE — A9270 NON-COVERED ITEM OR SERVICE: HCPCS | Performed by: SURGERY

## 2023-02-01 PROCEDURE — 700111 HCHG RX REV CODE 636 W/ 250 OVERRIDE (IP)

## 2023-02-01 PROCEDURE — 700105 HCHG RX REV CODE 258: Performed by: HOSPITALIST

## 2023-02-01 PROCEDURE — A9270 NON-COVERED ITEM OR SERVICE: HCPCS | Performed by: NURSE PRACTITIONER

## 2023-02-01 PROCEDURE — A9270 NON-COVERED ITEM OR SERVICE: HCPCS

## 2023-02-01 PROCEDURE — 700102 HCHG RX REV CODE 250 W/ 637 OVERRIDE(OP)

## 2023-02-01 RX ORDER — AMOXICILLIN 250 MG
1 CAPSULE ORAL EVERY EVENING
Status: DISCONTINUED | OUTPATIENT
Start: 2023-02-01 | End: 2023-02-03 | Stop reason: HOSPADM

## 2023-02-01 RX ORDER — DOCUSATE SODIUM 100 MG/1
100 CAPSULE, LIQUID FILLED ORAL 2 TIMES DAILY
Status: DISCONTINUED | OUTPATIENT
Start: 2023-02-01 | End: 2023-02-03 | Stop reason: HOSPADM

## 2023-02-01 RX ADMIN — BUDESONIDE AND FORMOTEROL FUMARATE DIHYDRATE 2 PUFF: 160; 4.5 AEROSOL RESPIRATORY (INHALATION) at 18:37

## 2023-02-01 RX ADMIN — SENNOSIDES AND DOCUSATE SODIUM 1 TABLET: 50; 8.6 TABLET ORAL at 18:37

## 2023-02-01 RX ADMIN — MORPHINE SULFATE 2 MG: 4 INJECTION INTRAVENOUS at 12:41

## 2023-02-01 RX ADMIN — OXYCODONE HYDROCHLORIDE 15 MG: 5 TABLET ORAL at 10:41

## 2023-02-01 RX ADMIN — MEROPENEM 500 MG: 500 INJECTION, POWDER, FOR SOLUTION INTRAVENOUS at 18:39

## 2023-02-01 RX ADMIN — OXYCODONE HYDROCHLORIDE 15 MG: 5 TABLET ORAL at 18:44

## 2023-02-01 RX ADMIN — BUDESONIDE AND FORMOTEROL FUMARATE DIHYDRATE 2 PUFF: 160; 4.5 AEROSOL RESPIRATORY (INHALATION) at 06:15

## 2023-02-01 RX ADMIN — Medication 5 MG: at 22:04

## 2023-02-01 RX ADMIN — DOCUSATE SODIUM 100 MG: 100 CAPSULE, LIQUID FILLED ORAL at 09:49

## 2023-02-01 RX ADMIN — ENOXAPARIN SODIUM 40 MG: 40 INJECTION SUBCUTANEOUS at 18:37

## 2023-02-01 RX ADMIN — MEROPENEM 500 MG: 500 INJECTION, POWDER, FOR SOLUTION INTRAVENOUS at 22:08

## 2023-02-01 RX ADMIN — OXYCODONE HYDROCHLORIDE 15 MG: 5 TABLET ORAL at 22:03

## 2023-02-01 RX ADMIN — DOCUSATE SODIUM 100 MG: 100 CAPSULE, LIQUID FILLED ORAL at 18:37

## 2023-02-01 RX ADMIN — MEROPENEM 500 MG: 500 INJECTION, POWDER, FOR SOLUTION INTRAVENOUS at 09:54

## 2023-02-01 RX ADMIN — OXYCODONE HYDROCHLORIDE 15 MG: 5 TABLET ORAL at 06:06

## 2023-02-01 RX ADMIN — MEROPENEM 500 MG: 500 INJECTION, POWDER, FOR SOLUTION INTRAVENOUS at 04:00

## 2023-02-01 ASSESSMENT — PAIN DESCRIPTION - PAIN TYPE
TYPE: SURGICAL PAIN
TYPE: ACUTE PAIN
TYPE: ACUTE PAIN;SURGICAL PAIN
TYPE: ACUTE PAIN
TYPE: SURGICAL PAIN
TYPE: ACUTE PAIN;SURGICAL PAIN
TYPE: ACUTE PAIN;SURGICAL PAIN
TYPE: SURGICAL PAIN

## 2023-02-01 NOTE — CARE PLAN
The patient is Stable - Low risk of patient condition declining or worsening    Shift Goals  Clinical Goals: pain management, iv antibiotics insertion of bullard  Patient Goals: pain control and rest  Family Goals: STEW    Progress made toward(s) clinical / shift goals:  patient refused re insertion of bullard. However patient urinates on his own, pain is controlled with prn pain administration.

## 2023-02-01 NOTE — PROGRESS NOTES
"0715: Received bedside report and accepted care of patient. Patient currently resting in bed in no visible or stated signs of distress. Bed controls on and bed in locked and lowest position. Call light and personal possessions within reach. Patient educated about use of call light and verbalized understanding. Pt A&O x 4, vitals stable and on room air. Pt refusing bullard reinsertion at this time and states he would have it done \"later\". MD notified.    0930: Pt continuing to refuse bullard reinsertion. Education provided.    1200: Dr. Villanueva at bedside to assess pt and pt education provided on bullard reinsertion purpose. Pt refuses at this time.    1241: Pt c/o pain in abdomen and requesting pain medications. 2 mg morphine given and pt asked about bullard reinsertion after pain medication administration and pt refusing bullard at this time and states \"maybe later\". Education provided and will reassess later in the afternoon.     1730: Pt refusing bullard insertion. Education provided.   "

## 2023-02-01 NOTE — PROGRESS NOTES
DATE: 2/1/2023    Post Operative Day  4  Sigmoid colon resection with primary anastomosis and takedown of colovesical fistula, mobilization of the splenic flexure    INTERVAL EVENTS:  Tolerating GI soft  Denies BM, + flatus  Martin removed, voiding    - Colace and senna added, avoid bowel stimulants due to anastomosis     PHYSICAL EXAMINATION:  Vital Signs: Blood Pressure (Abnormal) 133/90   Pulse 85   Temperature 36.4 °C (97.6 °F) (Temporal)   Respiration 18   Height 1.829 m (6')   Weight 79.5 kg (175 lb 4.3 oz)   Oxygen Saturation 91%     Physical Exam  Vitals and nursing note reviewed.   Constitutional:       General: He is not in acute distress.     Appearance: He is not toxic-appearing.   HENT:      Mouth/Throat:      Mouth: Mucous membranes are moist.      Pharynx: Oropharynx is clear.   Eyes:      Conjunctiva/sclera: Conjunctivae normal.   Cardiovascular:      Rate and Rhythm: Normal rate.      Pulses: Normal pulses.   Pulmonary:      Effort: Pulmonary effort is normal.   Chest:      Chest wall: No tenderness.   Abdominal:      General: There is distension (mild).      Palpations: Abdomen is soft.      Tenderness: There is no abdominal tenderness. There is no guarding.      Comments: Midline incision approximated with staples    Musculoskeletal:      Comments: Moves all extremities    Skin:     General: Skin is warm and dry.   Neurological:      Mental Status: He is alert.         LABORATORY VALUES:       Recent Labs     01/31/23  0048   SODIUM 132*   POTASSIUM 4.3   CHLORIDE 93*   CO2 30   GLUCOSE 117*   BUN 14   CREATININE 0.72   CALCIUM 9.4                IMAGING:   CT-ABDOMEN-PELVIS WITH   Final Result      1.  Redemonstrated findings of sigmoid diverticulitis with tiny extraluminal air suggesting localized perforation and some adjacent phlegmon. There is no drainable abscess.   2.  Redemonstrated is a small colovesicular fistula involving the bladder dome with associated wall thickening and  persistent air within the urinary bladder.   3.  Changes of chronic liver disease/cirrhosis and mild splenomegaly.   4.  Additional stable findings as detailed.      CT-ABDOMEN-PELVIS WITH   Final Result         1.  Changes of sigmoid diverticulitis, small collection of fluid with focus of air adjacent to sigmoid colon is seen compatible with microperforation and small diverticular abscess.   2.  Colovesicular fistula is seen with air in the bladder.   3.  Irregular hepatic contour favoring changes of cirrhosis   4.  Segment 2 duodenal diverticula   5.  Small fat-containing right inguinal hernia   6.  Left nephrolithiasis   7.  Cholelithiasis   8.  Atherosclerosis      These findings were discussed with the patient's clinician, Kenji Coronel, on 1/21/2023 12:22 AM.          ASSESSMENT AND PLAN:   * Colovesical fistula- (present on admission)  Assessment & Plan  Associated with acute complicated diverticulitis.   1/28 Sigmoid colon resection with primary anastomosis and takedown of colovesical fistula, mobilization of the splenic flexure  1/31 Advanced to GI soft diet  [facility]           ____________________________________     ALBARO Mendoza.    DD: 2/1/2023  8:56 AM

## 2023-02-01 NOTE — CARE PLAN
"The patient is Stable - Low risk of patient condition declining or worsening    Shift Goals  Clinical Goals: Pain management, IV abx  Patient Goals: Pain control, rest  Family Goals: STEW    No adverse events during shift. Indwelling catheter removed @ 1545 per MD orders due to pt complaining of pain and discomfort in bladder area. Bullard output noted before removal (see flowsheets). Pt was able to urinate post bullard removal. Pt still needing bullard insertion at this time per MD orders and Urology recommendation due to recent surgery. Pt refused bullard at this time for dayshift and states he is agreeable with reinsertion \"later\" or during the night. Night shift nurse notified.     Progress made toward(s) clinical / shift goals:    Problem: Urinary Elimination:  Goal: Ability to achieve and maintain adequate renal perfusion and functioning will improve  Outcome: Progressing  Note: Pt maintaining 30mL/hour of UOP at this time.      Problem: Hemodynamics  Goal: Patient's hemodynamics, fluid balance and neurologic status will be stable or improve  Outcome: Progressing  Note: NO change in neuro status. Vitals stable. Pt aicha PO intake and has adequate UOP.     "

## 2023-02-01 NOTE — PROGRESS NOTES
At 1545 on 1/31/23 patients bullard was removed per doctors orders. RN passed on to this writer that Doctors orders were to re insert bullard on patient. Patient attempted to re insert bullard on patient at 2100 1/31/23 and 0445 2/1/23. Patient refused bullard insertion each time.

## 2023-02-02 LAB
ANION GAP SERPL CALC-SCNC: 8 MMOL/L (ref 7–16)
BUN SERPL-MCNC: 10 MG/DL (ref 8–22)
CALCIUM SERPL-MCNC: 9.1 MG/DL (ref 8.5–10.5)
CHLORIDE SERPL-SCNC: 92 MMOL/L (ref 96–112)
CO2 SERPL-SCNC: 30 MMOL/L (ref 20–33)
CREAT SERPL-MCNC: 0.59 MG/DL (ref 0.5–1.4)
GFR SERPLBLD CREATININE-BSD FMLA CKD-EPI: 110 ML/MIN/1.73 M 2
GLUCOSE SERPL-MCNC: 121 MG/DL (ref 65–99)
POTASSIUM SERPL-SCNC: 4.2 MMOL/L (ref 3.6–5.5)
SODIUM SERPL-SCNC: 130 MMOL/L (ref 135–145)

## 2023-02-02 PROCEDURE — 700102 HCHG RX REV CODE 250 W/ 637 OVERRIDE(OP)

## 2023-02-02 PROCEDURE — 770001 HCHG ROOM/CARE - MED/SURG/GYN PRIV*

## 2023-02-02 PROCEDURE — A9270 NON-COVERED ITEM OR SERVICE: HCPCS | Performed by: NURSE PRACTITIONER

## 2023-02-02 PROCEDURE — 99024 POSTOP FOLLOW-UP VISIT: CPT | Performed by: SURGERY

## 2023-02-02 PROCEDURE — 700102 HCHG RX REV CODE 250 W/ 637 OVERRIDE(OP): Performed by: NURSE PRACTITIONER

## 2023-02-02 PROCEDURE — 36415 COLL VENOUS BLD VENIPUNCTURE: CPT

## 2023-02-02 PROCEDURE — 80048 BASIC METABOLIC PNL TOTAL CA: CPT

## 2023-02-02 PROCEDURE — A9270 NON-COVERED ITEM OR SERVICE: HCPCS | Performed by: SURGERY

## 2023-02-02 PROCEDURE — 700102 HCHG RX REV CODE 250 W/ 637 OVERRIDE(OP): Performed by: SURGERY

## 2023-02-02 PROCEDURE — 700111 HCHG RX REV CODE 636 W/ 250 OVERRIDE (IP): Performed by: HOSPITALIST

## 2023-02-02 PROCEDURE — 700111 HCHG RX REV CODE 636 W/ 250 OVERRIDE (IP)

## 2023-02-02 PROCEDURE — 700105 HCHG RX REV CODE 258: Performed by: HOSPITALIST

## 2023-02-02 PROCEDURE — 700102 HCHG RX REV CODE 250 W/ 637 OVERRIDE(OP): Performed by: HOSPITALIST

## 2023-02-02 PROCEDURE — A9270 NON-COVERED ITEM OR SERVICE: HCPCS | Performed by: HOSPITALIST

## 2023-02-02 PROCEDURE — 99024 POSTOP FOLLOW-UP VISIT: CPT | Performed by: NURSE PRACTITIONER

## 2023-02-02 PROCEDURE — A9270 NON-COVERED ITEM OR SERVICE: HCPCS

## 2023-02-02 PROCEDURE — 99232 SBSQ HOSP IP/OBS MODERATE 35: CPT | Mod: GC | Performed by: INTERNAL MEDICINE

## 2023-02-02 RX ADMIN — Medication 5 MG: at 21:23

## 2023-02-02 RX ADMIN — MORPHINE SULFATE 2 MG: 4 INJECTION INTRAVENOUS at 01:05

## 2023-02-02 RX ADMIN — OXYCODONE HYDROCHLORIDE 15 MG: 5 TABLET ORAL at 14:17

## 2023-02-02 RX ADMIN — DOCUSATE SODIUM 100 MG: 100 CAPSULE, LIQUID FILLED ORAL at 16:26

## 2023-02-02 RX ADMIN — OXYCODONE HYDROCHLORIDE 15 MG: 5 TABLET ORAL at 05:52

## 2023-02-02 RX ADMIN — BUDESONIDE AND FORMOTEROL FUMARATE DIHYDRATE 2 PUFF: 160; 4.5 AEROSOL RESPIRATORY (INHALATION) at 05:54

## 2023-02-02 RX ADMIN — SENNOSIDES AND DOCUSATE SODIUM 1 TABLET: 50; 8.6 TABLET ORAL at 16:26

## 2023-02-02 RX ADMIN — DOCUSATE SODIUM 100 MG: 100 CAPSULE, LIQUID FILLED ORAL at 05:52

## 2023-02-02 RX ADMIN — MEROPENEM 500 MG: 500 INJECTION, POWDER, FOR SOLUTION INTRAVENOUS at 09:23

## 2023-02-02 RX ADMIN — OXYCODONE HYDROCHLORIDE 15 MG: 5 TABLET ORAL at 21:22

## 2023-02-02 RX ADMIN — MEROPENEM 500 MG: 500 INJECTION, POWDER, FOR SOLUTION INTRAVENOUS at 16:29

## 2023-02-02 RX ADMIN — OXYCODONE HYDROCHLORIDE 15 MG: 5 TABLET ORAL at 17:23

## 2023-02-02 RX ADMIN — MEROPENEM 500 MG: 500 INJECTION, POWDER, FOR SOLUTION INTRAVENOUS at 05:59

## 2023-02-02 RX ADMIN — ACETAMINOPHEN 1000 MG: 500 TABLET, FILM COATED ORAL at 14:18

## 2023-02-02 RX ADMIN — MEROPENEM 500 MG: 500 INJECTION, POWDER, FOR SOLUTION INTRAVENOUS at 21:25

## 2023-02-02 RX ADMIN — ENOXAPARIN SODIUM 40 MG: 40 INJECTION SUBCUTANEOUS at 16:26

## 2023-02-02 ASSESSMENT — PATIENT HEALTH QUESTIONNAIRE - PHQ9
2. FEELING DOWN, DEPRESSED, IRRITABLE, OR HOPELESS: NOT AT ALL
SUM OF ALL RESPONSES TO PHQ9 QUESTIONS 1 AND 2: 0
1. LITTLE INTEREST OR PLEASURE IN DOING THINGS: NOT AT ALL

## 2023-02-02 ASSESSMENT — PAIN DESCRIPTION - PAIN TYPE
TYPE: ACUTE PAIN
TYPE: SURGICAL PAIN
TYPE: ACUTE PAIN
TYPE: SURGICAL PAIN
TYPE: ACUTE PAIN

## 2023-02-02 NOTE — PROGRESS NOTES
"    DATE: 2/2/2023    Post Operative Day  5  Sigmoid colon resection with primary anastomosis and takedown of colovesical fistula, mobilization of the splenic flexure.    INTERVAL EVENTS:  Tolerating diet  Voiding  Stooling  Adequate pain control  ABX to complete today    OK to DC from surgical standpoint    May shower, no baths until staple removal  No bowel stimulants secondary to anastomosis  Follow up with [facility] or local provider for staple removal 10-14 days post op (2/10/2023)  No heavy lifting until cleared by a provider.  All of the above discussed with patient.    PHYSICAL EXAMINATION:  Vital Signs: BP (!) 140/89   Pulse 85   Temp 36.8 °C (98.3 °F) (Temporal)   Resp 18   Ht 1.829 m (6')   Wt 79.5 kg (175 lb 4.3 oz)   SpO2 91%   Physical Exam  Vitals and nursing note reviewed.   Constitutional:       General: He is not in acute distress.     Appearance: He is not ill-appearing.   HENT:      Mouth/Throat:      Mouth: Mucous membranes are moist.      Pharynx: Oropharynx is clear.   Eyes:      General:         Right eye: No discharge.         Left eye: No discharge.      Extraocular Movements: Extraocular movements intact.   Pulmonary:      Effort: Pulmonary effort is normal. No respiratory distress.   Abdominal:      Comments: Hard  Rotund and semi firm - pt states his normal \"vodka and tea\" belly  Tenderness with palpation in the midline as expected  Dressing clean and intact  [place] sutures present   Skin:     General: Skin is warm and dry.      Capillary Refill: Capillary refill takes less than 2 seconds.   Neurological:      Mental Status: He is alert and oriented to person, place, and time.   Psychiatric:         Behavior: Behavior normal.         Thought Content: Thought content normal.     ASSESSMENT AND PLAN:   * Colovesical fistula- (present on admission)  Assessment & Plan  Associated with acute complicated diverticulitis.   1/28 Sigmoid colon resection with primary anastomosis and takedown of " colovesical fistula, mobilization of the splenic flexure  1/31 Advanced to GI soft diet  ACS blue service    Discussed patient condition with RN, Patient, and Dr. Ayoub .

## 2023-02-02 NOTE — PROGRESS NOTES
[facility] Daily Progress Note    Date of Service  2/2/2023    [facility] Team: [facility] IM White Team   Attending: Tobin Villanueva M.d.  Senior Resident: Dr. Kumar  Intern:  Dr. Noe  Contact Number: [phone]    Chief Complaint  Jason Yip is a 62 y.o. male admitted 1/20/2023 with abdominal pain.     Hospital Course  Jason Yip is a 62 y.o. male who presented 1/20/2023 abdominal pain.   He has a past medical history of cirrhosis (seen on imaging) COPD and polysubstance use (however patient states that he quit several weeks ago). Patient initially presented to [facility] in [facility] on 1/4/2023 reporting 2 months of difficulty with urination. On initial presentation he was found to be septic with a leukocytosis of over 14,000.  At the time CT abdomen pelvis revealed severe sigmoid diverticulitis with inflammatory phlegmon formation and possible colovesicular fistula. At that time he was treated with ertapenem, Flagyl, and also treated for alcohol withdrawal.  Surgery was consulted and recommended no surgical intervention at the time.   He later presented to [facility] again on 1/15/2023 for evaluation of lower abdominal pain and bubbles in his urine.  Urine cultures at the time revealed resistance to ciprofloxacin.  Repeat CT revealed sigmoid colovesicular fistula and patient was transferred to [facility] for higher level of care on 1/17/2023.  (Records not available from [facility]) Per patient while he was at [facility] he was told that he required IV antibiotics and surgical intervention on outpatient basis after antibiotic treatment for 2 weeks and was discharged on 1/19/2023. Patient states that he was discharged on p.o. Augmentin with a Taxi voucher that took him somewhere in [place] rather than back to [place] and he has been homeless since then. He presented to [facility] and was discharged  back to the shelter 1/19. He was brought by ambulance from [facility] on 1/20 for continued abd pain.     While admitted here general surgery was consulted and recommended liquid diet, broad spectrum abx and plan for possible sigmoid resection by Dr. Leong. Urine culture from admission was positive for ESBL e coli on 1/23 so patient was started on Meropenem. On 1/28 he underwent sigmoid colon resection with primary anastomosis and takedown of colovesicular fistula. On 1/28 patient underwent sigmoid colon resection and take down of colovesicular fistula. His course of IV Meropenem was completed 2/2.     Interval Problem Update  This morning Mr Yip said his abd pain was decreased compared to yesterday but still present. He said that he had been able to eat and go to the bathroom okay so far and said that he is peeing well without his bullard. He did not some L forearm pain and new onset itchiness and rash over chest and back.   -Last day of Meropenem today   -Pt will be ready for discharge soon, however, will need transportation to [facility] and will need close outpt follow up 10-14 days post-op (2/10/2023) to have his staples removed.     I have discussed this patient's plan of care and discharge plan at IDT rounds today with Case Management, Nursing, Nursing leadership, and other members of the IDT team.    Consultants/Specialty  general surgery    Code Status  Full Code    Disposition  Patient is not medically cleared for discharge.   Anticipate discharge to to home with close outpatient follow-up.  I have placed the appropriate orders for post-discharge needs.    Review of Systems  Review of Systems   Constitutional:  Positive for malaise/fatigue. Negative for fever.   HENT:  Negative for sinus pain and sore throat.  Respiratory:  Negative for cough, hemoptysis, sputum production, shortness of breath and wheezing.    Cardiovascular:  Negative for chest pain, palpitations, leg swelling and PND.  "  Gastrointestinal:  Positive for abdominal pain. Negative for blood in stool, constipation, diarrhea, heartburn, melena and vomiting.   Genitourinary:  Negative for dysuria, flank pain, frequency, hematuria and urgency.   Musculoskeletal:  Positive for back pain and myalgias. Negative for neck pain.   Neurological:  Negative for dizziness, weakness and headaches.     Physical Exam  Temp:  [36.2 °C (97.1 °F)-36.8 °C (98.3 °F)] 36.8 °C (98.3 °F)  Pulse:  [82-85] 85  Resp:  [18] 18  BP: (132-140)/(82-89) 140/89  SpO2:  [91 %-93 %] 91 %    Physical Exam  Vitals and nursing note reviewed.   Constitutional:       General: He is not in acute distress.     Appearance: Normal appearance. He is not ill-appearing or toxic-appearing.   Cardiovascular:      Rate and Rhythm: Normal rate and regular rhythm.      Pulses: Normal pulses.      Heart sounds: Normal heart sounds. No murmur heard.    No gallop.   Pulmonary:      Effort: Pulmonary effort is normal. No respiratory distress.      Breath sounds: Normal breath sounds. No stridor. No wheezing, rhonchi or rales.   Abdominal:      Palpations: There is no mass.      Tenderness: There is abdominal tenderness. There is no right CVA tenderness, left CVA tenderness, guarding or rebound.      Hernia: No hernia is present.      Comments: Tender to palpation around surgical site.    Musculoskeletal:         General: No swelling, tenderness, deformity or signs of injury.      Right lower leg: No edema.      Left lower leg: No edema.      Comments: L forearm has small 1.5\" long raised vein, tender to palpation.    Skin:     General: Skin is warm and dry.      Coloration: Skin is not jaundiced.      Findings: Rash present. No bruising.   Neurological:      Mental Status: He is alert.        Fluids    Intake/Output Summary (Last 24 hours) at 2/2/2023 1103  Last data filed at 2/1/2023 2000  Gross per 24 hour   Intake 360 ml   Output --   Net 360 ml       Laboratory      Recent Labs     " 01/31/23  0048 02/02/23  0158   SODIUM 132* 130*   POTASSIUM 4.3 4.2   CHLORIDE 93* 92*   CO2 30 30   GLUCOSE 117* 121*   BUN 14 10   CREATININE 0.72 0.59   CALCIUM 9.4 9.1                   Imaging  CT-ABDOMEN-PELVIS WITH    Result Date: 1/27/2023  1.  Redemonstrated findings of sigmoid diverticulitis with tiny extraluminal air suggesting localized perforation and some adjacent phlegmon. There is no drainable abscess. 2.  Redemonstrated is a small colovesicular fistula involving the bladder dome with associated wall thickening and persistent air within the urinary bladder. 3.  Changes of chronic liver disease/cirrhosis and mild splenomegaly. 4.  Additional stable findings as detailed.    CT-ABDOMEN-PELVIS WITH    Result Date: 1/21/2023  1.  Changes of sigmoid diverticulitis, small collection of fluid with focus of air adjacent to sigmoid colon is seen compatible with microperforation and small diverticular abscess. 2.  Colovesicular fistula is seen with air in the bladder. 3.  Irregular hepatic contour favoring changes of cirrhosis 4.  Segment 2 duodenal diverticula 5.  Small fat-containing right inguinal hernia 6.  Left nephrolithiasis 7.  Cholelithiasis 8.  Atherosclerosis These findings were discussed with the patient's clinician, Kenji Coronel, on 1/21/2023 12:22 AM.    CT-RENAL COLIC EVALUATION(A/P W/O)    Result Date: 1/4/2023  IMPRESSION: 1. Severe sigmoid diverticulosis with severe diverticulitis with inflammatory phlegmon along the right with inflammatory changes extending to the dome of the bladder and 2 adjacent loop of small bowel. No drainable abscess is present. 2. These findings were discussed with Dr. Gore shortly following completion of the study.    Electronically signed by: Carlos Taylor 1/4/2023 11:39 AM       [id number] VIEW    Result Date: 1/15/2023  IMPRESSION: 1. Mild adynamic ileus.       Electronically signed by: Carlos Taylor 1/15/2023 12:52 PM       DX-CHEST-PORTABLE (1  VIEW)    Result Date: 1/15/2023  IMPRESSION: 1. No evidence of acute process in the chest.    Electronically signed by: Carlos Taylor 1/15/2023 12:50 PM       CT ABDOMEN-PELVIS WITH IV CONTRAST    Result Date: 1/15/2023  IMPRESSION: 1. Severe sigmoid diverticulitis with probable multiple small abscesses in the surrounding tissues. No large drainable abscess is seen. The inflammatory changes extend to the bladder. Air in the bladder is consistent with a sigmoid colon bladder fistula. 2. Tiny gallstone without evidence of acute cholecystitis or bile duct dilatation. 3. Splenomegaly without focal abnormality.    Electronically signed by: Carlos Taylor 1/15/2023 4:25 PM       CT-CTA CHEST PULMONARY ARTERY W/ RECONS    Result Date: 1/16/2023  IMPRESSION: 1. No evidence of pulmonary emboli. 2. New subsegmental atelectasis right lower lobe.    Electronically signed by: Carlos Taylor 1/16/2023 12:15 PM           Assessment/Plan  Problem Representation:    * Colovesical fistula    Assessment & Plan  -Seen on imaging on 1/17 and again on 1/21. Dx confirmed by air in bladder and by sx of pneumaturia.   -Per Dr. Leong who evaluated patient at [facility] 1/17, recommended 2 weeks of antibiotics prior to proceeding with any surgery.   -Prior cultures revealed resistance to ciprofloxacin.   -Repeat urine culture ESBL E Coli.   -s/p sigmoid resection with primary anastomosis and take down of colovesicular fistula on 1/28.   Plan:   -Tylenol, Oxycodone, and morphine for pain.   -Meropenem until 2/2.    -surgery cleared for DC.      Complicated Diverticulitis- (present on admission)  Assessment & Plan  -Complicated by multiple phlegmon and possible colovesicular fistula (seen on CT 1/21)  -Repeat CT 1/27 noted extraluminal air around sigmoid diverticulitis suggesting local perforation.   -s/p sigmoid resection with primary anastomosis and take down of colovesicular fistula on 1/28.   Plan:   -Pain  control with scheduled tylenol, oxycodone 5-15 mg q3h prn, and IV morphine 4mg q3h prn.   -IV meropenem until 2/2   -surgery following     Hyponatremia   Assessment & Plan  -Serum Osm 284 (WNL) indicating isotonic hyponatremia. Ddx hyperproteinuria or hyperlipidemia.   -128 on admission. Improving to 130s with PO intake.   -Pt has recurrent myalgias, could be sx of hyponatremia.  -Uosm and Amanda elevated.   -Lipid panel 6/6/22 WNL  -UA 1/20 notable for proteinuria.   Plan:   -can consider ordering urine protein creatinine ratio if hyponatremia worsens.     Rash   Assessment & Plan  Ddx topical irritation, exanthematous drug eruption, vs transient acantholytic dermatosis   -on physical exam appears as multiple blanching macules present on chest and back.   -Shower and lotion     Superficial Thrombophlebitis   Assessment & Plan  -Present on L anterior forearm   -Warm compress as needed      Bacteremia (?)  Assessment & Plan  -Per outside hospital records blood cultures positive for gram positive cocci  -Repeat blood cultures negative.   -Requested [facility] records      COPD (chronic obstructive pulmonary disease) (HCC)  Assessment & Plan  -No PFTs on file  -Chest x-ray clear on admission, no wheezing on exam  -Symbicort daily  -As needed albuterol     Alcoholic cirrhosis of liver without ascites (HCC)  Assessment & Plan  -Cirrhotic changes seen on imaging  -Patient is mildly distended on exam however no clear signs of ascites.  No hepatic encephalopathy at this time.      History of methamphetamine use  Assessment & Plan  -Patient denies any use in the past several weeks  -Encourage continued sobriety     Nephrolithiasis  Assessment & Plan  -Nonobstructing nephrolithiasis, incidental finding on imaging.  -Continue to monitor renal function and urine output     Cholelithiasis  Assessment & Plan  -Asymptomatic  -Outpatient follow up        VTE prophylaxis: SCDs/TEDs, lovenox    I have performed a physical exam and reviewed  and updated ROS and Plan today (2/2/2023). In review of yesterday's note (2/1/2023), there are no changes except as documented above.

## 2023-02-02 NOTE — DIETARY
Nutrition Services Brief Update:    Day 12 of admit.  Jason Yip is a 62 y.o. male with admitting DX of Diverticulitis [K57.92]    Current Diet: Low Fiber (GI Soft)    Diet advanced on 1/31, documented PO intake since then has been % of meals.    Problem: Nutritional:  Goal: Achieve adequate nutritional intake  Description: Patient will consume >50% of meals  Outcome: Progressing    RD continues to follow.

## 2023-02-02 NOTE — PROGRESS NOTES
[facility] Internal Medicine Daily Progress Note    Date of Service  2/1/2023    R Team: R IM White Team   Attending: Tobin Villanueva M.d.  Senior Resident: Dr. Kumar  Intern:  Dr. Noe  Contact Number: [phone]    Chief Complaint  Jason Yip is a 62 y.o. male admitted 1/20/2023 with abdominal pain.     Hospital Course  No notes on file    Interval Problem Update  Concerns for bullard clog yesterday afternoon, was removed with intent to change bullard.  Pt refused replacement.  Has been urinating regularly.  Abdominal pain is slightly improved but still present, appears to be due to surgical pain more than bladder.  Has been tolerating diet well.      I have discussed this patient's plan of care and discharge plan at IDT rounds today with Case Management, Nursing, Nursing leadership, and other members of the IDT team.    Consultants/Specialty  general surgery    Code Status  Full Code    Disposition  Patient is not medically cleared for discharge.   Anticipate discharge to to home with close outpatient follow-up.  I have placed the appropriate orders for post-discharge needs.    Review of Systems  Review of Systems   Constitutional:  Positive for malaise/fatigue. Negative for fever.   HENT:  Negative for sinus pain and sore throat.    Respiratory:  Negative for cough, hemoptysis, sputum production, shortness of breath and wheezing.    Cardiovascular:  Negative for chest pain, palpitations, leg swelling and PND.   Gastrointestinal:  Positive for abdominal pain. Negative for blood in stool, constipation, diarrhea, heartburn, melena and vomiting.   Genitourinary:  Positive for dysuria. Negative for flank pain, frequency, hematuria and urgency.        Patient said he had pain with peeing, however currently has a catheter.    Musculoskeletal:  Positive for back pain and myalgias. Negative for neck pain.   Neurological:  Negative for dizziness, weakness and headaches.       Physical Exam  Temp:  [36.2 °C (97.1 °F)-37  °C (98.6 °F)] 36.2 °C (97.1 °F)  Pulse:  [] 84  Resp:  [17-18] 18  BP: ()/(75-90) 134/86  SpO2:  [91 %-93 %] 93 %    Physical Exam  Vitals and nursing note reviewed.   Constitutional:       Appearance: Normal appearance. He is normal weight.   Cardiovascular:      Rate and Rhythm: Regular rate and rhythm.      Pulses: Normal pulses.      Heart sounds: Normal heart sounds. No murmur heard.    No friction rub. No gallop.   Pulmonary:      Effort: Pulmonary effort is normal. No respiratory distress.      Breath sounds: Normal breath sounds. No stridor. No wheezing, rhonchi or rales.   Abdominal:      General: Abdomen is flat. Bowel sounds are normal. There is no distension.      Palpations: There is no mass.      Tenderness: There is abdominal tenderness. There is guarding. There is no rebound.      Comments: Tender to palpation all quadrants.  Bladder no longer palpable   Neurological:      Mental Status: He is alert.        Fluids    Intake/Output Summary (Last 24 hours) at 2/1/2023 1657  Last data filed at 2/1/2023 1030  Gross per 24 hour   Intake 480 ml   Output --   Net 480 ml       Laboratory        Recent Labs     01/31/23  0048   SODIUM 132*   POTASSIUM 4.3   CHLORIDE 93*   CO2 30   GLUCOSE 117*   BUN 14   CREATININE 0.72   CALCIUM 9.4                     Imaging  CT-ABDOMEN-PELVIS WITH    Result Date: 1/27/2023  1.  Redemonstrated findings of sigmoid diverticulitis with tiny extraluminal air suggesting localized perforation and some adjacent phlegmon. There is no drainable abscess. 2.  Redemonstrated is a small colovesicular fistula involving the bladder dome with associated wall thickening and persistent air within the urinary bladder. 3.  Changes of chronic liver disease/cirrhosis and mild splenomegaly. 4.  Additional stable findings as detailed.    CT-ABDOMEN-PELVIS WITH    Result Date: 1/21/2023  1.  Changes of sigmoid diverticulitis, small collection of fluid with focus of air adjacent to  sigmoid colon is seen compatible with microperforation and small diverticular abscess. 2.  Colovesicular fistula is seen with air in the bladder. 3.  Irregular hepatic contour favoring changes of cirrhosis 4.  Segment 2 duodenal diverticula 5.  Small fat-containing right inguinal hernia 6.  Left nephrolithiasis 7.  Cholelithiasis 8.  Atherosclerosis These findings were discussed with the patient's clinician, Kenji Coronel, on 1/21/2023 12:22 AM.    CT-RENAL COLIC EVALUATION(A/P W/O)    Result Date: 1/4/2023  IMPRESSION: 1. Severe sigmoid diverticulosis with severe diverticulitis with inflammatory phlegmon along the right with inflammatory changes extending to the dome of the bladder and 2 adjacent loop of small bowel. No drainable abscess is present. 2. These findings were discussed with Dr. oGre shortly following completion of the study.    Electronically signed by: Carlos Taylor 1/4/2023 11:39 AM       [id number] VIEW    Result Date: 1/15/2023  IMPRESSION: 1. Mild adynamic ileus.       Electronically signed by: Carlos Taylor 1/15/2023 12:52 PM       DX-CHEST-PORTABLE (1 VIEW)    Result Date: 1/15/2023  IMPRESSION: 1. No evidence of acute process in the chest.    Electronically signed by: Carlos Taylor 1/15/2023 12:50 PM       CT ABDOMEN-PELVIS WITH IV CONTRAST    Result Date: 1/15/2023  IMPRESSION: 1. Severe sigmoid diverticulitis with probable multiple small abscesses in the surrounding tissues. No large drainable abscess is seen. The inflammatory changes extend to the bladder. Air in the bladder is consistent with a sigmoid colon bladder fistula. 2. Tiny gallstone without evidence of acute cholecystitis or bile duct dilatation. 3. Splenomegaly without focal abnormality.    Electronically signed by: Carlos Taylor 1/15/2023 4:25 PM       CT-CTA CHEST PULMONARY ARTERY W/ RECONS    Result Date: 1/16/2023  IMPRESSION: 1. No evidence of pulmonary emboli. 2. New subsegmental atelectasis  right lower lobe.    Electronically signed by: Carlos Taylor 1/16/2023 12:15 PM          Assessment/Plan  Problem Representation:    * Colovesical fistula    Assessment & Plan  -Seen on imaging on 1/17 and again on 1/21. Dx confirmed by air in bladder and by sx of pneumaturia.   -Per Dr. Leong who evaluated patient at [facility] 1/17, recommended 2 weeks of antibiotics prior to proceeding with any surgery.   -Prior cultures revealed resistance to ciprofloxacin.   -Repeat urine culture ESBL E Coli.   -s/p sigmoid resection with primary anastomosis and take down of colovesicular fistula on 1/28.   Plan:   -Tylenol, Oxycodone, and morphine for pain.   -Continue Meropenem until 2/2.    -Continue bullard at least until 2/2.   -surgery following     Complicated Diverticulitis- (present on admission)  Assessment & Plan  -Complicated by multiple phlegmon and possible colovesicular fistula (seen on CT 1/21)  -Repeat CT 1/27 noted extraluminal air around sigmoid diverticulitis suggesting local perforation.   -s/p sigmoid resection with primary anastomosis and take down of colovesicular fistula on 1/28.   Plan:   -Pain control with scheduled tylenol, oxycodone 5-15 mg q3h prn, and IV morphine 4mg q3h prn.   -IV meropenem until 2/2   -surgery following     Hyponatremia  Assessment & Plan  -Serum Osm 284 (WNL) indicating isotonic hyponatremia. Ddx hyperproteinuria or hyperlipidemia.   -128 on admission. Improving to 130s with PO intake.   -Pt has recurrent myalgias, could be sx of hyponatremia.  -Uosm and Amanda elevated.   -Lipid panel 6/6/22 WNL  -UA 1/20 notable for proteinuria.   Plan:   -can consider ordering urine protein creatinine ratio if hyponatremia worsens.      Bacteremia (?)  Assessment & Plan  -Per outside hospital records blood cultures positive for gram positive cocci  -Repeat blood cultures negative.   -Requested [facility] records      COPD (chronic obstructive pulmonary disease)  (HCC)  Assessment & Plan  -No PFTs on file  -Chest x-ray clear on admission, no wheezing on exam  -Symbicort daily  -As needed albuterol     Alcoholic cirrhosis of liver without ascites (HCC)  Assessment & Plan  -Cirrhotic changes seen on imaging  -Patient is mildly distended on exam however no clear signs of ascites.  No hepatic encephalopathy at this time.      History of methamphetamine use  Assessment & Plan  -Patient denies any use in the past several weeks  -Encourage continued sobriety     Nephrolithiasis  Assessment & Plan  -Nonobstructing nephrolithiasis, incidental finding on imaging.  -Continue to monitor renal function and urine output     Cholelithiasis  Assessment & Plan  -Asymptomatic  -Outpatient follow up      VTE prophylaxis: SCDs/TEDs, lovenox    I have performed a physical exam and reviewed and updated ROS and Plan today (2/1/2023). In review of yesterday's note (1/31/2023), there are no changes except as documented above.

## 2023-02-02 NOTE — CARE PLAN
The patient is Stable - Low risk of patient condition declining or worsening    Shift Goals  Clinical Goals: pain control, bullard care, IV ATB treatment  Patient Goals: pain mgt, adequate rest  Family Goals: STEW    Progress made toward(s) clinical / shift goals:  pain controlled, bullard care done, with IV atb  last one today .     Patient is not progressing towards the following goals:

## 2023-02-02 NOTE — CARE PLAN
The patient is Stable - Low risk of patient condition declining or worsening    Shift Goals  Clinical Goals: Pain management, IV abx, Bullard insertion  Patient Goals: Pain control, eat, rest  Family Goals: STEW    No adverse events during shift. Pt refusing bullard reinsertion after several attempts at providing education, MD aware (please see previous note). Pt able to have a small bowel movement in the morning today (see flowsheets). Pt also still maintaining an adequate urine output during shift.     Progress made toward(s) clinical / shift goals:    Problem: Urinary Elimination:  Goal: Ability to achieve and maintain adequate renal perfusion and functioning will improve  Outcome: Progressing  Note: Pt able to maintain a UOP during shift.      Problem: Bowel Elimination  Goal: Establish and maintain regular bowel function  Outcome: Progressing  Note: Small bowel movement today

## 2023-02-02 NOTE — CARE PLAN
The patient is Stable - Low risk of patient condition declining or worsening    Shift Goals  Clinical Goals: pain management, bullard insertion  Patient Goals: comfort and rest  Family Goals: STEW    Progress made toward(s) clinical / shift goals:  Patients pain is controlled with regular prn pain medication administration. Patient ambulates without assistance and urinary function is regular. Patient agreed to have bullard re inserted at appox 0100 this am per doctors order.

## 2023-02-02 NOTE — DISCHARGE INSTRUCTIONS
Post Operative Discharge Instructions:    1. DIET: Upon discharge from the hospital, you may resume your normal preoperative diet, unless specifically directed otherwise. Depending on how you are feeling and whether you have nausea or not, you may wish to stay with a bland diet for the first few days. However, you can advance this as quickly as you feel ready.    2. ACTIVITIES: After discharge from the hospital, you may resume full routine activities; however, there should be no heavy lifting (greater than 20 pounds or a bag of groceries) and no strenuous activities for at least 2 weeks. The duration may be longer, depending on your surgical procedure. Routine activities of daily living are acceptable. Activity level should be addressed at your post-op follow up appointment.    3. DRIVING: You may drive whenever you are off pain medications and are able to perform the activities needed to drive, i.e., turning, bending, twisting, etc.    4. BATHING: You may get the wound wet at any time after leaving the hospital. You may shower, but do not submerge in a bath for at least two weeks.  If you have wound dressings, they may come off after 48 hours.  If you have skin glue to the wound, this will fall off on its own, do not pick at it.  If you have Steri-Strips to the wound, these will fall off on their own, do not pick at them. You may trim the edges if needed.    5. BOWEL FUNCTION:   After surgery, it is not uncommon for patients to develop either frequent or loose stools after meals. This usually corrects itself after a few days, to a few weeks. If this occurs, do not worry; this will resolve on its own.  Constipation is much more common than loose stools. The cause is the combination of pain medication and decreased activity level and possibly the nature of the surgical procedure performed. If you feel this is occurring, you may use an over-the-counter treatment such as MiraLAX (or Milk of Magnesia, Ex-Lax,  Senokot, etc.) until the problem has resolved. Drink plenty of water and try to wean off narcotic pain medications as soon as is comfortable for you.    6. PAIN MEDICATION:   You will be given a prescription for pain medication at discharge. Please take these as directed. It is important to remember not to take medications on an empty stomach as this may cause nausea.  You may also take over the counter acetaminophen and/or NSAIDS (ibuprofen, Aleve, Advil, Motrin) per the package instructions.  You may also use ice to the wound to decrease pain and swelling. You may alternate 20 minutes on and 20 minutes off with the ice for the first 24-48 hours. Make sure you place a washcloth or towel between the ice pack and your skin.  Please note that narcotic pain medication cannot be refilled unless you are seen by a doctor. Make sure you call the office if you are running low on medication or if the dose you have been prescribed is not working well for you.    7.CALL THE [facility] AT [phone] IF YOU HAVE:  (1) Fevers to more than 101F, (2) Unusual chest or leg pain, (3) Drainage or fluid from incision that may be foul smelling, increased tenderness or soreness at the wound or the wound edges are no longer together, redness or swelling at the incision site. Do not hesitate to call with any other questions.    You will need your staples removed no later than 2/10/2023 should you choose to follow up in your home area or are unable to follow up with [facility]

## 2023-02-03 ENCOUNTER — PHARMACY VISIT (OUTPATIENT)
Dept: PHARMACY | Facility: MEDICAL CENTER | Age: 63
End: 2023-02-03
Payer: COMMERCIAL

## 2023-02-03 VITALS
HEIGHT: 72 IN | OXYGEN SATURATION: 95 % | WEIGHT: 175.27 LBS | RESPIRATION RATE: 18 BRPM | HEART RATE: 74 BPM | DIASTOLIC BLOOD PRESSURE: 84 MMHG | TEMPERATURE: 97.2 F | SYSTOLIC BLOOD PRESSURE: 121 MMHG | BODY MASS INDEX: 23.74 KG/M2

## 2023-02-03 PROCEDURE — A9270 NON-COVERED ITEM OR SERVICE: HCPCS | Performed by: NURSE PRACTITIONER

## 2023-02-03 PROCEDURE — A9270 NON-COVERED ITEM OR SERVICE: HCPCS | Performed by: SURGERY

## 2023-02-03 PROCEDURE — 700102 HCHG RX REV CODE 250 W/ 637 OVERRIDE(OP): Performed by: NURSE PRACTITIONER

## 2023-02-03 PROCEDURE — 700102 HCHG RX REV CODE 250 W/ 637 OVERRIDE(OP): Performed by: SURGERY

## 2023-02-03 PROCEDURE — RXMED WILLOW AMBULATORY MEDICATION CHARGE

## 2023-02-03 PROCEDURE — 99239 HOSP IP/OBS DSCHRG MGMT >30: CPT | Mod: GC | Performed by: INTERNAL MEDICINE

## 2023-02-03 PROCEDURE — 51798 US URINE CAPACITY MEASURE: CPT

## 2023-02-03 PROCEDURE — 700111 HCHG RX REV CODE 636 W/ 250 OVERRIDE (IP)

## 2023-02-03 RX ORDER — OXYCODONE HYDROCHLORIDE 5 MG/1
5-15 TABLET ORAL EVERY 6 HOURS PRN
Qty: 20 TABLET | Refills: 0 | Status: SHIPPED | OUTPATIENT
Start: 2023-02-03 | End: 2023-02-10

## 2023-02-03 RX ORDER — ALBUTEROL SULFATE 2.5 MG/3ML
2.5 SOLUTION RESPIRATORY (INHALATION) EVERY 6 HOURS PRN
Qty: 75 ML | Refills: 0 | Status: SHIPPED | OUTPATIENT
Start: 2023-02-03

## 2023-02-03 RX ADMIN — OXYCODONE HYDROCHLORIDE 10 MG: 5 TABLET ORAL at 15:20

## 2023-02-03 RX ADMIN — SENNOSIDES AND DOCUSATE SODIUM 1 TABLET: 50; 8.6 TABLET ORAL at 18:09

## 2023-02-03 RX ADMIN — OXYCODONE HYDROCHLORIDE 10 MG: 5 TABLET ORAL at 18:08

## 2023-02-03 RX ADMIN — OXYCODONE HYDROCHLORIDE 10 MG: 5 TABLET ORAL at 08:16

## 2023-02-03 RX ADMIN — BUDESONIDE AND FORMOTEROL FUMARATE DIHYDRATE 2 PUFF: 160; 4.5 AEROSOL RESPIRATORY (INHALATION) at 18:08

## 2023-02-03 RX ADMIN — ENOXAPARIN SODIUM 40 MG: 40 INJECTION SUBCUTANEOUS at 18:08

## 2023-02-03 RX ADMIN — OXYCODONE HYDROCHLORIDE 15 MG: 5 TABLET ORAL at 00:34

## 2023-02-03 RX ADMIN — DOCUSATE SODIUM 100 MG: 100 CAPSULE, LIQUID FILLED ORAL at 18:09

## 2023-02-03 RX ADMIN — OXYCODONE HYDROCHLORIDE 15 MG: 5 TABLET ORAL at 05:00

## 2023-02-03 RX ADMIN — BUDESONIDE AND FORMOTEROL FUMARATE DIHYDRATE 2 PUFF: 160; 4.5 AEROSOL RESPIRATORY (INHALATION) at 04:59

## 2023-02-03 ASSESSMENT — PAIN DESCRIPTION - PAIN TYPE
TYPE: ACUTE PAIN
TYPE: ACUTE PAIN

## 2023-02-03 NOTE — PROGRESS NOTES
Spoke with Amy rich sister. She has set up transportation for the patient through [facility], ride is en route from [place] approx 2 hours away.

## 2023-02-03 NOTE — DISCHARGE SUMMARY
[facility] Internal Medicine Discharge Summary    Attending: Tobin Villanueva M.d.  Senior Resident: Dr. Shahram Kumar   Intern:  Dr. Murali Noe   Contact Number: [phone]    CHIEF COMPLAINT ON ADMISSION  Chief Complaint   Patient presents with    Dizziness    Abdominal Pain     Upper quadrants, denies NVD       Reason for Admission  EMS     Admission Date  1/20/2023    CODE STATUS  Full Code    HPI & HOSPITAL COURSE  Jason Yip is a 62 y.o. male who presented 1/20/2023 abdominal pain.   He has a past medical history of cirrhosis (seen on imaging) COPD and polysubstance use (however patient states that he quit several weeks ago). Patient initially presented to [facility] in [facility] on 1/4/2023 reporting 2 months of difficulty with urination. On initial presentation he was found to be septic with a leukocytosis of over 14,000.  At the time CT abdomen pelvis revealed severe sigmoid diverticulitis with inflammatory phlegmon formation and possible colovesicular fistula. At that time he was treated with ertapenem, Flagyl, and also treated for alcohol withdrawal.  Surgery was consulted and recommended no surgical intervention at the time.     He later presented to [facility] again on 1/15/2023 for evaluation of lower abdominal pain and bubbles in his urine.  Urine cultures at the time revealed resistance to ciprofloxacin.  Repeat CT revealed sigmoid colovesicular fistula and patient was transferred to [facility] for higher level of care on 1/17/2023.  (Records not available from [facility]) Per patient while he was at [facility] he was told that he required IV antibiotics and surgical intervention on outpatient basis after antibiotic treatment for 2 weeks and he was discharged from [facility] on 1/19/2023. Patient states that he was discharged on p.o. Augmentin with a Taxi voucher that took him somewhere  in [place] rather than back to [place]. He then presented to [facility] and was discharged to the homeless shelter 1/19. On 1/20 He was brought by ambulance from [facility] for continued abdominal pain.     While admitted here general surgery was consulted and recommended liquid diet, broad spectrum abx. Urine culture from admission was positive for ESBL e coli on 1/23 so patient was started on Meropenem. On 1/28 he underwent sigmoid colon resection with primary anastomosis and takedown of colovesicular fistula. His course of IV Meropenem was completed 2/2. Patient was recovering well from his procedure, he had tolerable pain with PO medications, so [place] with plan to return home to [place].     Therefore, he is discharged in fair and stable condition to home with close outpatient follow-up.    The patient met 2-midnight criteria for an inpatient stay at the time of discharge.    Discharge Date  2/3/2021    Physical Exam on Day of Discharge  Physical Exam  Vitals and nursing note reviewed.   Constitutional:       General: He is not in acute distress.     Appearance: Normal appearance. He is not ill-appearing or toxic-appearing.   Cardiovascular:      Rate and Rhythm: Normal rate and regular rhythm.      Pulses: Normal pulses.      Heart sounds: Normal heart sounds. No murmur heard.    No gallop.   Pulmonary:      Effort: Pulmonary effort is normal. No respiratory distress.      Breath sounds: Normal breath sounds. No stridor. No wheezing, rhonchi or rales.   Abdominal:      Palpations: There is no mass.      Tenderness: There is abdominal tenderness. There is no right CVA tenderness, left CVA tenderness, guarding or rebound.      Hernia: No hernia is present.      Comments: Bandage was removed, tender to palpation around surgical site; mild erythema however appears to be healing well.   Musculoskeletal:         General: No swelling, tenderness, deformity or signs of injury.       Right lower leg: No edema.      Left lower leg: No edema.   Skin:     General: Skin is warm and dry.      Coloration: Skin is not jaundiced.      Findings: Rash present. No bruising.      Comment: Multiple blanching macules on chest and back. Improved compared to yesterday.   Neurological:      Mental Status: He is alert and oriented.     FOLLOW UP ITEMS POST DISCHARGE  Follow up with surgeon near [place] for staple removal. Follow up with primary care provider for alcoholic liver cirrhosis and for further assessment for COPD.     DISCHARGE DIAGNOSES  Principal Problem:    Colovesical fistula POA: Yes      Overview: Associated with acute complicated diverticulitis.             Recommend IV antibiotics and re-scan in about 5 days to evaluate for       improvement. Symptoms have reportedly improved clinically over the past       week since he's been in [place].             Discussed with patient that our only other option at this point is to do       an open surgery and colostomy placement. After discussion we are in       agreement that he would like a prolonged antibiotic course to try to avoid       that.             Ok from my standpoint for a regular diet.   Active Problems:    Diverticulitis POA: Yes    COPD (chronic obstructive pulmonary disease) (HCC) POA: Unknown    Alcoholic cirrhosis of liver without ascites (HCC) POA: Unknown    Hyponatremia POA: Unknown    Cholelithiasis POA: Unknown    Nephrolithiasis POA: Unknown    History of methamphetamine use POA: Unknown    Bacteremia POA: Unknown  Resolved Problems:    * No resolved hospital problems. *      FOLLOW UP  No future appointments.  [facility]  [address]  [place] [id number]  [phone]  Schedule an appointment as soon as possible for a visit  follow up in [facility] clinic on 2/10/2023 - call to make an appointment for wound check and staple removal.      MEDICATIONS ON DISCHARGE     Medication List        START taking  these medications        Instructions   albuterol 2.5mg/3ml Nebu solution for nebulization  Commonly known as: PROVENTIL   Take 3 mL by nebulization every 6 hours as needed for Shortness of Breath.  Dose: 2.5 mg     oxyCODONE immediate-release 5 MG Tabs  Commonly known as: ROXICODONE   Take 1-3 Tablets by mouth every 6 hours as needed for Severe Pain for up to 20 doses.  Dose: 5-15 mg              Allergies  Allergies   Allergen Reactions    Keflex Hives       DIET  Orders Placed This Encounter   Procedures    Diet Order Diet: Low Fiber(GI Soft)     Standing Status:   Standing     Number of Occurrences:   1     Order Specific Question:   Diet:     Answer:   Low Fiber(GI Soft) [2]       ACTIVITY  As tolerated.  Weight bearing as tolerated    CONSULTATIONS  General Surgery- Dr. Pb Sparrow and Dr. Lori Howe   Urology - Dr. Joseph Taylor     PROCEDURES  1/28 sigmoid colon resection with primary anastomosis and takedown of colovesicular fistula performed by Dr. Pb Sparrow and Dr. Lori Howe     1/28 Temporary bilateral ureter stent placement to help aid in identification of bilateral ureters during surgery. Performed by Dr. Joseph Taylor.     LABORATORY  Lab Results   Component Value Date    SODIUM 130 (L) 02/02/2023    POTASSIUM 4.2 02/02/2023    CHLORIDE 92 (L) 02/02/2023    CO2 30 02/02/2023    GLUCOSE 121 (H) 02/02/2023    BUN 10 02/02/2023    CREATININE 0.59 02/02/2023        Lab Results   Component Value Date    WBC 11.5 (H) 01/28/2023    HEMOGLOBIN 15.9 01/28/2023    HEMATOCRIT 46.7 01/28/2023    PLATELETCT 309 01/28/2023        Total time of the discharge process exceeds 35 minutes.

## 2023-02-03 NOTE — DISCHARGE PLANNING
Case Management Discharge Planning    Admission Date: 1/20/2023  GMLOS: 3  ALOS: 13    6-Clicks ADL Score: 24  6-Clicks Mobility Score: 23      Anticipated Discharge Dispo: pt to have ride from family. F/U for staple removal via own provider or ED. Pt Provided w/ PCP referral line to obtain a PCP if he chooses to stay locally.     DME Needed: No    Action(s) Taken: Updated Provider/Nurse on Discharge Plan    Escalations Completed: None    Medically Clear: Yes    Next Steps: DC when medically cleared.     Barriers to Discharge: Transportation Per RN Pt is calling family to transport back to [place].   CM also provided pt w/ bus pass, local entire resource packed w/ shelters/ hot meals food pantries if indicated.    Is the patient up for discharge tomorrow: or today anticipated Yes      Is transport arranged for discharge disposition:   Pt provided with local bus pass if he is discharged before his family/friend from [place] comes to pick him (an approx. 4 hr drive).    [facility] states pt's insurance is to cover his ride home. Daljit request made via fax and called Daljit to explained pt is discharged today and asking for [place] Medicaid (Medical) to pay for ride home to [place] if covered by this insurance.

## 2023-02-03 NOTE — PROGRESS NOTES
Discharge order received. Updated Pt and Pt sister when she called.  Sister was going to  pt at first and  then wanted to set up transport. Updated . CM came to pt room and talked to pt. Gave pt discharge shelter resources and bus pass.  This RN went to update pt about his discharge plan. Pt refused to leave and passed his phone to this RN because sister in on the phone. Pt sister yelled this RN through the phone and stated that it was in appropriate for him to get discharged to shelter with bus pass. And requested to talk to  and leader. Updated  Helen and  escalated to  Gabbi

## 2023-02-03 NOTE — PROGRESS NOTES
Contacted MD to clarify bullard orders. Per MD will discontinue bullard and initiate voiding trials per protocol.

## 2023-02-03 NOTE — CARE PLAN
The patient is Stable - Low risk of patient condition declining or worsening    Shift Goals  Clinical Goals: Clarify bullard orders, pain management, discharge planning  Patient Goals: rest, go home, adequate sleep  Family Goals: STEW    Progress made toward(s) clinical / shift goals:    Problem: Pain - Standard  Goal: Alleviation of pain or a reduction in pain to the patient’s comfort goal  Outcome: Progressing  Note: PRN pain medications in use for pain control.       Problem: Wound/ / Incision Healing  Goal: Patient's wound/surgical incision will decrease in size and heals properly  Outcome: Progressing  Note: Surgical site CDI, dressing CDI.       Patient is not progressing towards the following goals:

## 2023-02-04 NOTE — PROGRESS NOTES
Talked with transport company around 3pm. Transport is supposed to come around 6:00 pm. Updated pt. Discharging Patient home per physician order.  Discharged with pain meds.  Demonstrated understanding of discharge instructions, follow up appointments, home medications, prescriptions, Ambulating without  assistance, voiding without difficulty, pain well controlled, tolerating oral medications, oxygen saturation greater than 90% , tolerating diet.  Verbalized understanding of discharge instructions and educational handouts.  All questions answered.  Belongings with patient at time of discharge. [facility] came and took pt around 18:30. IV removed. Pt was escorted to medical transport with wheelchair by CNA with all belongings. Updates given to sister Amy.

## (undated) DEVICE — CANISTER SUCTION 3000ML MECHANICAL FILTER AUTO SHUTOFF MEDI-VAC NONSTERILE LF DISP  (40EA/CA)

## (undated) DEVICE — SPONGE GAUZESTER 4 X 4 4PLY - (128PK/CA)

## (undated) DEVICE — LEGGING LITHOTOMY 31 X 48 IN - (2EA/PK 20PK/CA)

## (undated) DEVICE — CATHETER FOLEY 30CC 18 FR - 2-WAY-100% SILICONE

## (undated) DEVICE — TUBING CLEARLINK DUO-VENT - C-FLO (48EA/CA)

## (undated) DEVICE — SET EXTENSION WITH 2 PORTS (48EA/CA) ***PART #2C8610 IS A SUBSTITUTE*****

## (undated) DEVICE — CATHETER FOLEY 16 FR. 30 CC - 2-WAY

## (undated) DEVICE — CONTAINER SPECIMEN BAG OR - STERILE 4 OZ W/LID (100EA/CA)

## (undated) DEVICE — STAPLER CIRCULAR POWERED 29MM ECHELON (3EA/BX)

## (undated) DEVICE — DRAPE UNDER BUTTOCK LRG (40/CA)

## (undated) DEVICE — CATHETER URETHRAL OPEN END AXXCESS (10EA/BX)

## (undated) DEVICE — GLOVE BIOGEL SZ 7 SURGICAL PF LTX - (50PR/BX 4BX/CA)

## (undated) DEVICE — JELLY SURGILUBE STERILE TUBE 4.25 OZ (1/EA)

## (undated) DEVICE — SUCTION INSTRUMENT YANKAUER BULBOUS TIP W/O VENT (50EA/CA)

## (undated) DEVICE — PACK MAJOR BASIN - (2EA/CA)

## (undated) DEVICE — SYRINGE 50ML CATHETER TIP (40EA/BX 4BX/CA)

## (undated) DEVICE — TRAY SKIN SCRUB PVP WET (20EA/CA) PART #DYND70356 DISCONTINUED

## (undated) DEVICE — SUTURE 2-0 SILK SH 30 IN C/R (12PK/BX)

## (undated) DEVICE — SODIUM CHL IRRIGATION 0.9% 1000ML (12EA/CA)

## (undated) DEVICE — LACTATED RINGERS INJ 1000 ML - (14EA/CA 60CA/PF)

## (undated) DEVICE — PAD LAP STERILE 18 X 18 - (5/PK 40PK/CA)

## (undated) DEVICE — LIGASURE LAPAROSCOPIC 5MM - (6EA/CA)

## (undated) DEVICE — TOWELS CLOTH SURGICAL - (4/PK 20PK/CA)

## (undated) DEVICE — GOWN WARMING STANDARD FLEX - (30/CA)

## (undated) DEVICE — DRESSING LEUKOMED STERILE 11.75X4IN - (50/CA)

## (undated) DEVICE — COVER LIGHT HANDLE ALC PLUS DISP (18EA/BX)

## (undated) DEVICE — SUTURE 1 PDS II PLUS TP-1 - (12PK/BX)

## (undated) DEVICE — KIT SIGMOIDOSCOPE W/BULB AND - SUCTION (1/PK 10PK/CA)

## (undated) DEVICE — CHLORAPREP 26 ML APPLICATOR - ORANGE TINT(25/CA)

## (undated) DEVICE — SUTURE 2-0 PROLENE SH D/A (36PK/BX)

## (undated) DEVICE — BAG SPONGE COUNT 10.25 X 32 - BLUE (250/CA)

## (undated) DEVICE — STAPLER CONTOUR CURVED GREEN 4.8MM W/STAPLE (3EA/BX)

## (undated) DEVICE — SUTURE 3-0 SILK SH (12PK/BX)

## (undated) DEVICE — GOWN SURGEONS LARGE - (32/CA)

## (undated) DEVICE — SET LEADWIRE 5 LEAD BEDSIDE DISPOSABLE ECG (1SET OF 5/EA)

## (undated) DEVICE — WIRE GUIDE SENSOR DUAL FLEX - 5/BX

## (undated) DEVICE — STAPLER 75MM LINEAR OPEN (3EA/BX)

## (undated) DEVICE — GLOVE SZ 6.5 BIOGEL PI MICRO - PF LF (50PR/BX)

## (undated) DEVICE — SUTURE GENERAL

## (undated) DEVICE — GLOVE BIOGEL SZ 7.5 SURGICAL PF LTX - (50PR/BX 4BX/CA)

## (undated) DEVICE — BAG URODRAIN WITH TUBING - (20/CA)

## (undated) DEVICE — WATER IRRIG. STER 3000 ML - (4/CA)

## (undated) DEVICE — GLOVE BIOGEL PI INDICATOR SZ 7.5 SURGICAL PF LF -(50/BX 4BX/CA)

## (undated) DEVICE — SLEEVE, VASO, THIGH, MED

## (undated) DEVICE — SENSOR OXIMETER ADULT SPO2 RD SET (20EA/BX)

## (undated) DEVICE — DRAPE MAYO STAND - (30/CA)

## (undated) DEVICE — STAPLER SKIN DISP - (6/BX 10BX/CA) VISISTAT

## (undated) DEVICE — WATER IRRIGATION STERILE 1000ML (12EA/CA)

## (undated) DEVICE — LIGASURE TISSUE FUSION  - SINGLE USE (6/CA)